# Patient Record
Sex: FEMALE | Race: WHITE | Employment: FULL TIME | URBAN - METROPOLITAN AREA
[De-identification: names, ages, dates, MRNs, and addresses within clinical notes are randomized per-mention and may not be internally consistent; named-entity substitution may affect disease eponyms.]

---

## 2017-01-03 ENCOUNTER — OFFICE VISIT (OUTPATIENT)
Dept: FAMILY MEDICINE | Facility: CLINIC | Age: 22
End: 2017-01-03
Payer: COMMERCIAL

## 2017-01-03 VITALS
OXYGEN SATURATION: 99 % | DIASTOLIC BLOOD PRESSURE: 68 MMHG | WEIGHT: 190 LBS | SYSTOLIC BLOOD PRESSURE: 122 MMHG | BODY MASS INDEX: 32.44 KG/M2 | TEMPERATURE: 99 F | HEIGHT: 64 IN

## 2017-01-03 DIAGNOSIS — Z71.84 COUNSELING ABOUT TRAVEL: Primary | ICD-10-CM

## 2017-01-03 DIAGNOSIS — Z23 VACCINE FOR SINGLE BACTERIAL DISEASE: ICD-10-CM

## 2017-01-03 PROCEDURE — 90691 TYPHOID VACCINE IM: CPT | Mod: GA | Performed by: FAMILY MEDICINE

## 2017-01-03 PROCEDURE — 90471 IMMUNIZATION ADMIN: CPT | Mod: GA | Performed by: FAMILY MEDICINE

## 2017-01-03 PROCEDURE — 99402 PREV MED CNSL INDIV APPRX 30: CPT | Mod: 25 | Performed by: FAMILY MEDICINE

## 2017-01-03 RX ORDER — ATOVAQUONE AND PROGUANIL HYDROCHLORIDE 250; 100 MG/1; MG/1
TABLET, FILM COATED ORAL
Qty: 25 TABLET | Refills: 0 | Status: SHIPPED | OUTPATIENT
Start: 2017-01-03 | End: 2017-01-18

## 2017-01-03 RX ORDER — CIPROFLOXACIN 500 MG/1
TABLET, FILM COATED ORAL
Qty: 18 TABLET | Refills: 0 | Status: SHIPPED | OUTPATIENT
Start: 2017-01-03 | End: 2017-01-18

## 2017-01-03 NOTE — MR AVS SNAPSHOT
"              After Visit Summary   1/3/2017    Chani Smith    MRN: [id number]           Patient Information     Date Of Birth          1995        Visit Information        Provider Department      1/3/2017 10:00 AM Radha Daniel, DO [facility]        Today's Diagnoses     Counseling about travel    -  1     Vaccine for single bacterial disease           Care Instructions    Today January 3, 2017 you received the    Typhoid - injectable. This vaccine is valid for two years.   .        **It is very important for the vaccinations to be given on the scheduled day(s), this helps ensure you receive the full effectiveness of the vaccine.**    Please call [facility] with any questions [phone]    Thank you for visiting [facility]          Follow-ups after your visit        Who to contact     If you have questions or need follow up information about today's clinic visit or your schedule please contact [facility] directly at [phone].  Normal or non-critical lab and imaging results will be communicated to you by [employer], letter or phone within 4 business days after the clinic has received the results. If you do not hear from us within 7 days, please contact the clinic through [employer] or phone. If you have a critical or abnormal lab result, we will notify you by phone as soon as possible.  Submit refill requests through [employer] or call your pharmacy and they will forward the refill request to us. Please allow 3 business days for your refill to be completed.          Additional Information About Your Visit        [employer] Information     [employer] lets you send messages to your doctor, view your test results, renew your prescriptions, schedule appointments and more. To sign up, go to [email or web address] . Click on \"Log in\" on the left side of the screen, which will take you to the Welcome page. Then click on \"Sign up Now\" on the right " "side of the page.     You will be asked to enter the access code listed below, as well as some personal information. Please follow the directions to create your username and password.     Your access code is: [email or web address]  Expires: 2017  4:20 PM     Your access code will  in 90 days. If you need help or a new code, please call your [place] clinic or [phone].        Care EveryWhere ID     This is your Care EveryWhere ID. This could be used by other organizations to access your [place] medical records  [id number]        Your Vitals Were     Temperature Height BMI (Body Mass Index) Pulse Oximetry          99  F (37.2  C) (Oral) 5' 4\" (1.626 m) 32.60 kg/m2 99%         Blood Pressure from Last 3 Encounters:   17 122/68   10/21/16 111/63   14 109/62    Weight from Last 3 Encounters:   17 190 lb (86.183 kg) (96.39 %*)   10/21/16 187 lb 12.8 oz (85.186 kg) (96.16 %*)   14 193 lb 6.4 oz (87.726 kg) (97.30 %*)     * Growth percentiles are based on [facility] 2-20 Years data.              We Performed the Following     TYPHOID VACCINE, IM          Today's Medication Changes          These changes are accurate as of: 1/3/17 10:21 AM.  If you have any questions, ask your nurse or doctor.               Start taking these medicines.        Dose/Directions    atovaquone-proguanil 250-100 MG per tablet   Commonly known as:  MALARONE   Used for:  Counseling about travel   Started by:  Radha Daniel, DO        Take one tablet daily, start 1 day prior to travel to malaria area and continue daily while there and for 7 days after leaving malaria area   Quantity:  25 tablet   Refills:  0       ciprofloxacin 500 MG tablet   Commonly known as:  CIPRO   Used for:  Counseling about travel   Started by:  Radha Daniel, DO        Take one tablet twice a day for up to 3 days as needed for traveler's diarrhea, may repeat as needed   Quantity:  18 tablet   Refills:  0            Where to get your medicines    "   These medications were [place] [facility] [place] [address]  [address] , [facility] [address]     Phone:  [phone]    - atovaquone-proguanil 250-100 MG per tablet  - ciprofloxacin 500 MG tablet             Primary Care Provider Office Phone # Fax #    VERONICA Mac [phone]       [facility] [address]  [facility] [address]        Thank you!     Thank you for choosing [facility]  for your care. Our goal is always to provide you with excellent care. Hearing back from our patients is one way we can continue to improve our services. Please take a few minutes to complete the written survey that you may receive in the mail after your visit with us. Thank you!             Your Updated Medication List - Protect others around you: Learn how to safely use, store and throw away your medicines at www.disposemymeds.org.          This list is accurate as of: 1/3/17 10:21 AM.  Always use your most recent med list.                   Brand Name Dispense Instructions for use    atovaquone-proguanil 250-100 MG per tablet    MALARONE    25 tablet    Take one tablet daily, start 1 day prior to travel to malaria area and continue daily while there and for 7 days after leaving malaria area       ciprofloxacin 500 MG tablet    CIPRO    18 tablet    Take one tablet twice a day for up to 3 days as needed for traveler's diarrhea, may repeat as needed

## 2017-01-03 NOTE — NURSING NOTE
"Chief Complaint   Patient presents with     Travel Clinic     [facility]     /68 mmHg  Temp(Src) 99  F (37.2  C) (Oral)  Ht 5' 4\" (1.626 m)  Wt 190 lb (86.183 kg)  BMI 32.60 kg/m2  SpO2 99% Estimated body mass index is 32.6 kg/(m^2) as calculated from the following:    Height as of this encounter: 5' 4\" (1.626 m).    Weight as of this encounter: 190 lb (86.183 kg).  bp completed using cuff size: regular      Health Maintenance that is potentially due pending provider review:  NONE    n/a  Madeline Gaming M.A.    "

## 2017-01-03 NOTE — PROGRESS NOTES
"  Itinerary:  [place]    Departure Date: 02/02/17    Return Date: 06/15/17    Length of Trip: 133 days    Purpose of Trip: school    Urban/Rural: both    Accommodations: host family/hotel    IMMUNIZATION HISTORY  Have you received any immunizations within the past 4 weeks?  No  Have you ever fainted from having your blood drawn or from an injection?  No  Have you ever had a fever reaction to vaccination?  No  Have you ever had any bad reaction or side effect from any vaccination?  No  Have you ever had hepatitis A or B vaccine?  Yes  Do you live (or work closely) with anyone who has AIDS, an AIDS-like condition, any other immune disorder or who is on chemotherapy for cancer or a   family history of immunodeficiency?  No  Have you received any injection of immune globulin or any blood products during the past 12 months?  No    Patient roomed by Madeline Gaming M.A.      Special medical concerns: none    /68 mmHg  Temp(Src) 99  F (37.2  C) (Oral)  Ht 5' 4\" (1.626 m)  Wt 190 lb (86.183 kg)  BMI 32.60 kg/m2  SpO2 99%  EXAM: deferred    Immunizations discussed include: Typhoid and pt declined flu  Discussed importance of rabies if any bites or scratches  Malaraia prophylaxis recommended: Malarone  Symptomatic treatment for traveler's diarrhea: ciprofloxacin    ASSESSMENT/PLAN:  1. Vaccine for single bacterial disease     - TYPHOID VACCINE, IM    2. Counseling about travel     - atovaquone-proguanil (MALARONE) 250-100 MG per tablet; Take one tablet daily, start 1 day prior to travel to malaria area and continue daily while there and for 7 days after leaving malaria area  Dispense: 25 tablet; Refill: 0  - ciprofloxacin (CIPRO) 500 MG tablet; Take one tablet twice a day for up to 3 days as needed for traveler's diarrhea, may repeat as needed  Dispense: 18 tablet; Refill: 0  I have reviewed general recommendations for safe travel   including: food/water precautions, insect avoidance,   roadway safety. Educational " materials and [employer] report provided.    Total visit time 30 minutes with over 50% of time spent counseling patient.

## 2017-01-03 NOTE — PATIENT INSTRUCTIONS
Today January 3, 2017 you received the    Typhoid - injectable. This vaccine is valid for two years.   .        **It is very important for the vaccinations to be given on the scheduled day(s), this helps ensure you receive the full effectiveness of the vaccine.**    Please call [facility] with any questions [phone]    Thank you for visiting [facility]

## 2017-01-18 ENCOUNTER — TELEPHONE (OUTPATIENT)
Dept: FAMILY MEDICINE | Facility: CLINIC | Age: 22
End: 2017-01-18

## 2017-01-18 ENCOUNTER — OFFICE VISIT (OUTPATIENT)
Dept: FAMILY MEDICINE | Facility: CLINIC | Age: 22
End: 2017-01-18
Payer: COMMERCIAL

## 2017-01-18 VITALS
WEIGHT: 190 LBS | BODY MASS INDEX: 32.44 KG/M2 | SYSTOLIC BLOOD PRESSURE: 122 MMHG | HEIGHT: 64 IN | TEMPERATURE: 98 F | HEART RATE: 62 BPM | DIASTOLIC BLOOD PRESSURE: 62 MMHG

## 2017-01-18 DIAGNOSIS — B00.1 RECURRENT COLD SORES: Primary | ICD-10-CM

## 2017-01-18 DIAGNOSIS — L30.4 INTERTRIGO: ICD-10-CM

## 2017-01-18 PROCEDURE — 99214 OFFICE O/P EST MOD 30 MIN: CPT | Performed by: NURSE PRACTITIONER

## 2017-01-18 RX ORDER — CLOTRIMAZOLE 1 %
CREAM (GRAM) TOPICAL 2 TIMES DAILY
Qty: 30 G | Refills: 1 | Status: SHIPPED | OUTPATIENT
Start: 2017-01-18 | End: 2019-02-12

## 2017-01-18 RX ORDER — ACYCLOVIR 50 MG/G
OINTMENT TOPICAL
Qty: 30 G | Refills: 2 | Status: SHIPPED | OUTPATIENT
Start: 2017-01-18 | End: 2019-02-12

## 2017-01-18 ASSESSMENT — PAIN SCALES - GENERAL: PAINLEVEL: NO PAIN (0)

## 2017-01-18 NOTE — PATIENT INSTRUCTIONS
Herpes: Caring for Sores  Good hygiene matters when you have herpes. Take care of your sores to speed healing. Neglected sores can lead to other infections.     Warm baths can ease itching and burning caused by sores.   To ease your symptoms    Take any medications as directed.    Take acetaminophen or ibuprofen for pain.    Take warm or cool baths to relieve itching of sores. And don t share towels when you have a sore.    Urinate in a tub of warm water to prevent burning. This works for women with genital herpes.    Don t wear tight clothes or nylon underwear. They can trap moisture, cause chafing, and prevent sores from healing.  To speed healing    Wash the sores with mild soap and water. And wash your hands after you touch a sore.    Dry the affected area completely.    Don t bandage sores. The dry air helps them heal.    Avoid ointments unless they are prescribed. They retain moisture and may cause other infections.    Don t pick at the sores. This can slow healing.    Don t touch your eyes when you have a sore. The virus may travel from your fingertips to your eyes.  Resources  American Social Health Association STD Hotline  747.493.2391  www.ashastd.org  Centers for Disease Control and Prevention  502.330.6444  www.cdc.gov/std     4508-3060 [employer], "[employer] [address] [place] [address], [place] [address]. All rights reserved. This information is not intended as a substitute for professional medical care. Always follow your healthcare professional's instructions.

## 2017-01-18 NOTE — MR AVS SNAPSHOT
After Visit Summary   1/18/2017    Chani Smith    MRN: [id number]           Patient Information     Date Of Birth          1995        Visit Information        Provider Department      1/18/2017 8:40 AM Kellie Echeverria APRN [facility]        Today's Diagnoses     Recurrent cold sores    -  1     Intertrigo           Care Instructions      Herpes: Caring for Sores  Good hygiene matters when you have herpes. Take care of your sores to speed healing. Neglected sores can lead to other infections.     Warm baths can ease itching and burning caused by sores.   To ease your symptoms    Take any medications as directed.    Take acetaminophen or ibuprofen for pain.    Take warm or cool baths to relieve itching of sores. And don t share towels when you have a sore.    Urinate in a tub of warm water to prevent burning. This works for women with genital herpes.    Don t wear tight clothes or nylon underwear. They can trap moisture, cause chafing, and prevent sores from healing.  To speed healing    Wash the sores with mild soap and water. And wash your hands after you touch a sore.    Dry the affected area completely.    Don t bandage sores. The dry air helps them heal.    Avoid ointments unless they are prescribed. They retain moisture and may cause other infections.    Don t pick at the sores. This can slow healing.    Don t touch your eyes when you have a sore. The virus may travel from your fingertips to your eyes.  Resources  American Social Health Association STD Hotline  188.642.8126  www.ashastd.org  Centers for Disease Control and Prevention  466.360.9773  www.cdc.gov/std     0292-8080 [employer]. [address] [place] [address]. All rights reserved. This information is not intended as a substitute for professional medical care. Always follow your healthcare professional's instructions.              Follow-ups after your visit        Who  "to contact     If you have questions or need follow up information about today's clinic visit or your schedule please contact [facility] directly at [phone].  Normal or non-critical lab and imaging results will be communicated to you by MyChart, letter or phone within 4 business days after the clinic has received the results. If you do not hear from us within 7 days, please contact the clinic through MyChart or phone. If you have a critical or abnormal lab result, we will notify you by phone as soon as possible.  Submit refill requests through [employer] or call your pharmacy and they will forward the refill request to us. Please allow 3 business days for your refill to be completed.          Additional Information About Your Visit        [employer] Information     [employer] lets you send messages to your doctor, view your test results, renew your prescriptions, schedule appointments and more. To sign up, go to [email or web address] . Click on \"Log in\" on the left side of the screen, which will take you to the Welcome page. Then click on \"Sign up Now\" on the right side of the page.     You will be asked to enter the access code listed below, as well as some personal information. Please follow the directions to create your username and password.     Your access code is: [email or web address]  Expires: 2017  4:20 PM     Your access code will  in 90 days. If you need help or a new code, please call your [place] clinic or [phone].        Care EveryWhere ID     This is your Care EveryWhere ID. This could be used by other organizations to access your [place] medical records  [id number]        Your Vitals Were     Pulse Temperature Height BMI (Body Mass Index) Last Period       62 98  F (36.7  C) (Tympanic) 5' 4\" (1.626 m) 32.60 kg/m2 2017        Blood Pressure from Last 3 Encounters:   17 122/62   17 122/68   10/21/16 111/63    Weight from Last 3 Encounters:   17 190 lb " (86.183 kg) (96.38 %*)   01/03/17 190 lb (86.183 kg) (96.39 %*)   10/21/16 187 lb 12.8 oz (85.186 kg) (96.16 %*)     * Growth percentiles are based on [facility] 2-20 Years data.              Today, you had the following     No orders found for display         Today's Medication Changes          These changes are accurate as of: 1/18/17  9:07 AM.  If you have any questions, ask your nurse or doctor.               Start taking these medicines.        Dose/Directions    acyclovir 5 % ointment   Commonly known as:  ZOVIRAX   Used for:  Recurrent cold sores   Started by:  Kellie Echeverria APRN CNP        Apply topically 6 times daily   Quantity:  30 g   Refills:  2       clotrimazole 1 % cream   Commonly known as:  LOTRIMIN   Used for:  Intertrigo   Started by:  Kellie Echeverria APRN CNP        Apply topically 2 times daily   Quantity:  30 g   Refills:  1         Stop taking these medicines if you haven't already. Please contact your care team if you have questions.     atovaquone-proguanil 250-100 MG per tablet   Commonly known as:  MALARONE   Stopped by:  Klelie Echeverria APRN CNP           ciprofloxacin 500 MG tablet   Commonly known as:  CIPRO   Stopped by:  Kellie Echeverria APRN CNP                Where to get your medicines      These medications were sent to [facility] [address] [address]     Phone:  [phone]    - acyclovir 5 % ointment  - clotrimazole 1 % cream             Primary Care Provider Office Phone # Fax #    VERONICA Mac [phone]       [facility] [address] [facility] [address]        Thank you!     Thank you for choosing [facility]  for your care. Our goal is always to provide you with excellent care. Hearing back from our patients is one way we can continue to improve our services. Please take a few minutes to complete the written survey that  you may receive in the mail after your visit with us. Thank you!             Your Updated Medication List - Protect others around you: Learn how to safely use, store and throw away your medicines at www.disposemymeds.org.          This list is accurate as of: 1/18/17  9:07 AM.  Always use your most recent med list.                   Brand Name Dispense Instructions for use    acyclovir 5 % ointment    ZOVIRAX    30 g    Apply topically 6 times daily       clotrimazole 1 % cream    LOTRIMIN    30 g    Apply topically 2 times daily

## 2017-01-18 NOTE — NURSING NOTE
"Initial /62 mmHg  Pulse 62  Temp(Src) 98  F (36.7  C) (Tympanic)  Ht 5' 4\" (1.626 m)  Wt 190 lb (86.183 kg)  BMI 32.60 kg/m2  LMP 01/16/2017 Estimated body mass index is 32.6 kg/(m^2) as calculated from the following:    Height as of this encounter: 5' 4\" (1.626 m).    Weight as of this encounter: 190 lb (86.183 kg). .      "

## 2017-01-18 NOTE — TELEPHONE ENCOUNTER
This is non formulary. No alternatives listed on their web site.   Please ask her to contact her insurance company to find out what they will cover for meds for recurrent cold sores.   Otherwise she may want to purchase abreva over the counter.   [email or web address]  Thanks Kellie GOULD

## 2017-01-18 NOTE — TELEPHONE ENCOUNTER
PA required on: Acyclovir Ointment  Patient Insurance is: Blue Plus  Insurance phone number is:   ID #: [id number]  BIN#: [id number]    Please let us know when PA is granted or denied. Thank you.    Tawny Stanford, Float Technician  [place]: [place]

## 2017-01-18 NOTE — PROGRESS NOTES
"  SUBJECTIVE:                                                    Chani Smith is a 21 year old female who presents to clinic today for the following health issues:      Leaving for out of the country soon- [facility] - has been to travel clinic. Notes reviewed.   Would like some rx refills just in case she needs them while out of the country  One is for sold cores she has been using acyclovir and this works well for her.   And underarm rash that comes and goes. Better with clotrimazole worse in the summer when hot .          -------------------------------------    Problem list and histories reviewed & adjusted, as indicated.  Additional history: as documented    Labs reviewed in EPIC  Problem list, Medication list, Allergies, and Medical/Social/Surgical histories reviewed in [facility] and updated as appropriate.    ROS:  C: NEGATIVE for fever, chills, change in weight  INTEGUMENTARY/SKIN: as above   E/M: NEGATIVE for ear, mouth and throat problems  R: NEGATIVE for significant cough or SOB  CV: NEGATIVE for chest pain, palpitations or peripheral edema    OBJECTIVE:                                                    /62 mmHg  Pulse 62  Temp(Src) 98  F (36.7  C) (Tympanic)  Ht 5' 4\" (1.626 m)  Wt 190 lb (86.183 kg)  BMI 32.60 kg/m2  LMP 01/16/2017  Body mass index is 32.6 kg/(m^2).   GENERAL: healthy, alert, well nourished, well hydrated, no distress  HENT: ear canals- normal; TMs- normal; Nose- normal; Mouth- no ulcers, no lesions  NECK: no tenderness, no adenopathy, no asymmetry, no masses, no stiffness; thyroid- normal to palpation  RESP: lungs clear to auscultation - no rales, no rhonchi, no wheezes  CV: regular rates and rhythm, normal S1 S2, no S3 or S4 and no murmur, no click or rub -  ABDOMEN: soft, no tenderness, no  hepatosplenomegaly, no masses, normal bowel sounds    Diagnostic test results:  none      ASSESSMENT/PLAN:                                                    1. Recurrent cold " sores  Refill today   - acyclovir (ZOVIRAX) 5 % ointment; Apply topically 6 times daily  Dispense: 30 g; Refill: 2    2. Intertrigo  Symptomatic care strategies reviewed.   - clotrimazole (LOTRIMIN) 1 % cream; Apply topically 2 times daily  Dispense: 30 g; Refill: 1      Follow up with Provider - Call or return to the clinic with any worsening of symptoms or no resolution. Patient/Parent verbalized understanding and is in agreement. Medication side effects reviewed.   Current Outpatient Prescriptions   Medication Sig Dispense Refill     acyclovir (ZOVIRAX) 5 % ointment Apply topically 6 times daily 30 g 2     clotrimazole (LOTRIMIN) 1 % cream Apply topically 2 times daily 30 g 1        See Patient Instructions    VERONICA Mac [facility]

## 2017-07-07 ENCOUNTER — OFFICE VISIT (OUTPATIENT)
Dept: FAMILY MEDICINE | Facility: CLINIC | Age: 22
End: 2017-07-07
Payer: COMMERCIAL

## 2017-07-07 VITALS
WEIGHT: 184 LBS | BODY MASS INDEX: 31.58 KG/M2 | DIASTOLIC BLOOD PRESSURE: 72 MMHG | TEMPERATURE: 98 F | HEART RATE: 67 BPM | SYSTOLIC BLOOD PRESSURE: 114 MMHG

## 2017-07-07 DIAGNOSIS — Z30.09 COUNSELING FOR BIRTH CONTROL, ORAL CONTRACEPTIVES: Primary | ICD-10-CM

## 2017-07-07 DIAGNOSIS — E34.9 HORMONE IMBALANCE: ICD-10-CM

## 2017-07-07 LAB
ESTRADIOL SERPL-MCNC: 47 PG/ML
INSULIN SERPL-ACNC: 22.4 MU/L (ref 3–25)
PROGEST SERPL-MCNC: 1.3 NG/ML
TSH SERPL DL<=0.005 MIU/L-ACNC: 3.58 MU/L (ref 0.4–4)

## 2017-07-07 PROCEDURE — 84403 ASSAY OF TOTAL TESTOSTERONE: CPT | Performed by: NURSE PRACTITIONER

## 2017-07-07 PROCEDURE — 84443 ASSAY THYROID STIM HORMONE: CPT | Performed by: NURSE PRACTITIONER

## 2017-07-07 PROCEDURE — 82670 ASSAY OF TOTAL ESTRADIOL: CPT | Performed by: NURSE PRACTITIONER

## 2017-07-07 PROCEDURE — 36415 COLL VENOUS BLD VENIPUNCTURE: CPT | Performed by: NURSE PRACTITIONER

## 2017-07-07 PROCEDURE — 83525 ASSAY OF INSULIN: CPT | Performed by: NURSE PRACTITIONER

## 2017-07-07 PROCEDURE — 99214 OFFICE O/P EST MOD 30 MIN: CPT | Performed by: NURSE PRACTITIONER

## 2017-07-07 PROCEDURE — 84144 ASSAY OF PROGESTERONE: CPT | Performed by: NURSE PRACTITIONER

## 2017-07-07 RX ORDER — NORGESTIMATE AND ETHINYL ESTRADIOL 0.25-0.035
1 KIT ORAL DAILY
Qty: 90 TABLET | Refills: 3 | Status: SHIPPED | OUTPATIENT
Start: 2017-07-07 | End: 2018-05-10

## 2017-07-07 NOTE — PATIENT INSTRUCTIONS
For Teens: Birth Control Options  If you have sex, you can get pregnant. Birth control helps lessen the chance that you'll get pregnant during sex. Having sex is a serious decision that you should think about carefully. If you decide to have sex, your healthcare provider can help you decide which type of birth control is best for you. Some of the most common types are described below. No matter which type you choose, remember to use it every time.  Condoms  A condom is a thin covering that fits over the penis. A condom catches sperm that come out of the penis during sex. A condom also helps prevent the spread of certain sexually transmitted diseases (STDs).  Spermicide  Spermicide is a gel, foam, cream, or tablet that you put into your vagina. These kill sperm that touch them. They are most effective when used with a condom, diaphragm, or cervical cap.   The pill  The birth control pill is taken daily to stop your body from releasing an egg each month. It has to be taken at the same time each day.  Time-release hormones  Hormones like the ones used in birth control pills can be given in other ways. These include a skin patch, a ring inserted in your vagina, injections given in your arm or buttock every 3 months, or an implant placed in your arm that will remain for up to 3 years. You may find one of these methods easier to stick to than pills.  Diaphragm or cervical cap  Diaphragms and cervical caps are round rubber cups that keep sperm out of the uterus and hold spermicide in place. You put them into your vagina before you have sex.  Intrauterine device (IUD)  This is a device your health care provider will insert into the uterus and it can be left in place for 3, 5, or 10 years depending on the type you choose. This is only a good choice for those who are in stable monogamous relationships where both partners do not have sexually transmitted infections. This is because certain sexually transmitted infections can  cause a more serious infection with an IUD in place.     Remember  Here are important things to consider:    Except for condoms, birth control methods don't protect you from sexually transmitted diseases. And condoms don't give you 100% protection.    You can decide not to have sex. This is called abstinence. Abstinence is the only way to be absolutely sure you won't get pregnant.    Be sure you are ready before you make the decision to have sex. Don't have sex because you think everyone else is doing it.    Whatever birth control you use, learn how to use it the right way.   Date Last Reviewed: 5/9/2015 2000-2017 [employer]. [address] [place] [address], [place] [address]. All rights reserved. This information is not intended as a substitute for professional medical care. Always follow your healthcare professional's instructions.        Controlling Adult or Adolescent Acne     Look for water-based, oil-free skin care products. These are less likely to clog your pores.     You stand the best chance of controlling your acne if you follow your treatment plan. Be patient. Acne often takes months, not days or weeks, to improve. Ask your healthcare provider when you can expect your skin to look better. If you don t see results by your goal date, call your healthcare provider. He or she may want to give you some other type of treatment.  Using skin care products and cosmetics  Besides following your treatment plan, take care in choosing skin care products and cosmetics. The following tips may help:    Choose gentle, oil-free soaps and facial cleansers.    Avoid harsh acne scrubs, cleansers, or astringents. These types of products can irritate your skin.    Ask your healthcare provider before buying over-the-counter acne treatments. Some of these, such as those with benzoyl peroxide or salicylic acid, can work. But they often have side effects, such as skin getting too dry with treatments that are too  strong.    Read labels on makeup and moisturizers. Choose those that are water based and oil free. Look for the term noncomedogenic. It means that the product won t clog your pores.  Caring for your skin  The right skin care routine can help keep your skin healthy. To take good care of your skin, try these tips:    Gently wash your face or other affected skin twice a day with a mild cleanser. Using your fingertips, smooth the cleanser over your skin. Rinse your skin well. Pat it dry.    If your healthcare provider has approved any over-the-counter acne medicine, use as directed. Use it after you wash your skin. Apply the medicine to all areas where you get acne. Don t just treat acne you can see now. New blemishes may be in progress but not in view yet. Treat all the skin.    Don t squeeze or pick blemishes. Acne blemishes may heal on their own. But squeezing can cause scarring. Scars will remain after acne blemishes heal.    Sponges, brushes, or other abrasive tools can irritate the skin. Avoid using them.    If you use soft sponges or cloths to apply your makeup, keep them clean.  Getting good results  Learning more about acne is the first step toward controlling this condition. Know that acne under treatment frequently gets worse initially before it improves. But with proper treatment and skin care, you can manage your acne and feel better about your skin.   Date Last Reviewed: 5/1/2015 2000-2017 [employer]. [address] [place] [address], [place] [address]. All rights reserved. This information is not intended as a substitute for professional medical care. Always follow your healthcare professional's instructions.

## 2017-07-07 NOTE — PROGRESS NOTES
SUBJECTIVE:                                                    Chain Smith is a 21 year old female who presents to clinic today for the following health issues:      Contraception    Has been on birth control before- the pill  Would like the pill- possibly a lower hormone dose  Sexually active  Patient's last menstrual period was 06/07/2017.  Periods are not as bad  Least weight gain desired this time  Natural cosmetic use now and making the acne worse.   Got home from a 4 month trip to [facility].     abreva recently for cold sores         Labs  Would like labs done to check for hormone imbalance  Family history of diabetes.            Problem list and histories reviewed & adjusted, as indicated.  Additional history: as documented    Patient Active Problem List   Diagnosis     Acne     Ankle pain     Mood disorder (H)     Dysmenorrhea     Past Surgical History:   Procedure Laterality Date     BUNIONECTOMY MACKENZIE  4/22/2011    Procedure:BUNIONECTOMY MACKENZIE; Subtalar Implant; Surgeon:HELGA URENA; Location:[facility]     LENGTHEN TENDON ACHILLES  4/22/2011    Procedure:LENGTHEN TENDON ACHILLES; Surgeon:HELGA URENA; Location:[facility] OR       Social History   Substance Use Topics     Smoking status: Never Smoker     Smokeless tobacco: Never Used     Alcohol use No     Family History   Problem Relation Age of Onset     Other - See Comments Paternal Grandfather                  Reviewed and updated as needed this visit by clinical staff  Tobacco  Allergies       Reviewed and updated as needed this visit by Provider         ROS:   ROS: 10 point ROS neg other than the symptoms noted above in the HPI.      OBJECTIVE:                                                    /72  Pulse 67  Temp 98  F (36.7  C) (Tympanic)  Wt 184 lb (83.5 kg)  LMP 06/07/2017  BMI 31.58 kg/m2  Body mass index is 31.58 kg/(m^2).   GENERAL: healthy, alert, well nourished, well hydrated, no  distress  HENT: ear canals- normal; TMs- normal; Nose- normal; Mouth- no ulcers, no lesions  NECK: no tenderness, no adenopathy, no asymmetry, no masses, no stiffness; thyroid- normal to palpation  RESP: lungs clear to auscultation - no rales, no rhonchi, no wheezes  CV: regular rates and rhythm, normal S1 S2, no S3 or S4 and no murmur, no click or rub -  ABDOMEN: soft, no tenderness, no  hepatosplenomegaly, no masses, normal bowel sounds    Diagnostic test results:  Labs pending      ASSESSMENT/PLAN:                                                    1. Counseling for birth control, oral contraceptives  - norgestimate-ethinyl estradiol (ORTHO-CYCLEN, SPRINTEC) 0.25-35 MG-MCG per tablet; Take 1 tablet by mouth daily  Dispense: 90 tablet; Refill: 3    2. Hormone imbalance  - Estradiol  - Testosterone, total  - Progesterone  - Insulin level  - TSH with free T4 reflex      Follow up with Provider - Call or return to the clinic with any worsening of symptoms or no resolution. Patient/Parent verbalized understanding and is in agreement. Medication side effects reviewed.      See Patient Instructions    VERONICA Mac [facility]

## 2017-07-07 NOTE — NURSING NOTE
"Chief Complaint   Patient presents with     Contraception     talk about going on bc       Initial /72  Pulse 67  Temp 98  F (36.7  C) (Tympanic)  Wt 184 lb (83.5 kg)  LMP 06/07/2017  BMI 31.58 kg/m2 Estimated body mass index is 31.58 kg/(m^2) as calculated from the following:    Height as of 1/18/17: 5' 4\" (1.626 m).    Weight as of this encounter: 184 lb (83.5 kg).  Medication Reconciliation: complete    Health Maintenance that is potentially due pending provider review:  NONE    n/a      "

## 2017-07-07 NOTE — MR AVS SNAPSHOT
After Visit Summary   7/7/2017    Chani Smith    MRN: [id number]           Patient Information     Date Of Birth          1995        Visit Information        Provider Department      7/7/2017 8:20 AM Kellie Echeverria APRN [facility]        Today's Diagnoses     Counseling for birth control, oral contraceptives    -  1    Hormone imbalance          Care Instructions      For Teens: Birth Control Options  If you have sex, you can get pregnant. Birth control helps lessen the chance that you'll get pregnant during sex. Having sex is a serious decision that you should think about carefully. If you decide to have sex, your healthcare provider can help you decide which type of birth control is best for you. Some of the most common types are described below. No matter which type you choose, remember to use it every time.  Condoms  A condom is a thin covering that fits over the penis. A condom catches sperm that come out of the penis during sex. A condom also helps prevent the spread of certain sexually transmitted diseases (STDs).  Spermicide  Spermicide is a gel, foam, cream, or tablet that you put into your vagina. These kill sperm that touch them. They are most effective when used with a condom, diaphragm, or cervical cap.   The pill  The birth control pill is taken daily to stop your body from releasing an egg each month. It has to be taken at the same time each day.  Time-release hormones  Hormones like the ones used in birth control pills can be given in other ways. These include a skin patch, a ring inserted in your vagina, injections given in your arm or buttock every 3 months, or an implant placed in your arm that will remain for up to 3 years. You may find one of these methods easier to stick to than pills.  Diaphragm or cervical cap  Diaphragms and cervical caps are round rubber cups that keep sperm out of the uterus and hold spermicide in place. You put  them into your vagina before you have sex.  Intrauterine device (IUD)  This is a device your health care provider will insert into the uterus and it can be left in place for 3, 5, or 10 years depending on the type you choose. This is only a good choice for those who are in stable monogamous relationships where both partners do not have sexually transmitted infections. This is because certain sexually transmitted infections can cause a more serious infection with an IUD in place.     Remember  Here are important things to consider:    Except for condoms, birth control methods don't protect you from sexually transmitted diseases. And condoms don't give you 100% protection.    You can decide not to have sex. This is called abstinence. Abstinence is the only way to be absolutely sure you won't get pregnant.    Be sure you are ready before you make the decision to have sex. Don't have sex because you think everyone else is doing it.    Whatever birth control you use, learn how to use it the right way.   Date Last Reviewed: 5/9/2015 2000-2017 [employer]. [address] [place] [address]. All rights reserved. This information is not intended as a substitute for professional medical care. Always follow your healthcare professional's instructions.        Controlling Adult or Adolescent Acne     Look for water-based, oil-free skin care products. These are less likely to clog your pores.     You stand the best chance of controlling your acne if you follow your treatment plan. Be patient. Acne often takes months, not days or weeks, to improve. Ask your healthcare provider when you can expect your skin to look better. If you don t see results by your goal date, call your healthcare provider. He or she may want to give you some other type of treatment.  Using skin care products and cosmetics  Besides following your treatment plan, take care in choosing skin care products and cosmetics. The following  tips may help:    Choose gentle, oil-free soaps and facial cleansers.    Avoid harsh acne scrubs, cleansers, or astringents. These types of products can irritate your skin.    Ask your healthcare provider before buying over-the-counter acne treatments. Some of these, such as those with benzoyl peroxide or salicylic acid, can work. But they often have side effects, such as skin getting too dry with treatments that are too strong.    Read labels on makeup and moisturizers. Choose those that are water based and oil free. Look for the term noncomedogenic. It means that the product won t clog your pores.  Caring for your skin  The right skin care routine can help keep your skin healthy. To take good care of your skin, try these tips:    Gently wash your face or other affected skin twice a day with a mild cleanser. Using your fingertips, smooth the cleanser over your skin. Rinse your skin well. Pat it dry.    If your healthcare provider has approved any over-the-counter acne medicine, use as directed. Use it after you wash your skin. Apply the medicine to all areas where you get acne. Don t just treat acne you can see now. New blemishes may be in progress but not in view yet. Treat all the skin.    Don t squeeze or pick blemishes. Acne blemishes may heal on their own. But squeezing can cause scarring. Scars will remain after acne blemishes heal.    Sponges, brushes, or other abrasive tools can irritate the skin. Avoid using them.    If you use soft sponges or cloths to apply your makeup, keep them clean.  Getting good results  Learning more about acne is the first step toward controlling this condition. Know that acne under treatment frequently gets worse initially before it improves. But with proper treatment and skin care, you can manage your acne and feel better about your skin.   Date Last Reviewed: 5/1/2015 2000-2017 [employer]. [address] [place] [address], [place] [address]. All rights reserved. This  "information is not intended as a substitute for professional medical care. Always follow your healthcare professional's instructions.                Follow-ups after your visit        Who to contact     If you have questions or need follow up information about today's clinic visit or your schedule please contact [facility] directly at [phone].  Normal or non-critical lab and imaging results will be communicated to you by MyChart, letter or phone within 4 business days after the clinic has received the results. If you do not hear from us within 7 days, please contact the clinic through MyChart or phone. If you have a critical or abnormal lab result, we will notify you by phone as soon as possible.  Submit refill requests through [employer] or call your pharmacy and they will forward the refill request to us. Please allow 3 business days for your refill to be completed.          Additional Information About Your Visit        MyChart Information     [employer] lets you send messages to your doctor, view your test results, renew your prescriptions, schedule appointments and more. To sign up, go to [email or web address] . Click on \"Log in\" on the left side of the screen, which will take you to the Welcome page. Then click on \"Sign up Now\" on the right side of the page.     You will be asked to enter the access code listed below, as well as some personal information. Please follow the directions to create your username and password.     Your access code is: [email or web address]  Expires: 10/5/2017  9:03 AM     Your access code will  in 90 days. If you need help or a new code, please call your [facility] or [phone].        Care EveryWhere ID     This is your Care EveryWhere ID. This could be used by other organizations to access your [facility] medical records  [id number]        Your Vitals Were     Pulse Temperature Last Period BMI (Body Mass Index)          67 98  F (36.7  C) (Tympanic) " 06/07/2017 31.58 kg/m2         Blood Pressure from Last 3 Encounters:   07/07/17 114/72   01/18/17 122/62   01/03/17 122/68    Weight from Last 3 Encounters:   07/07/17 184 lb (83.5 kg) (95 %)*   01/18/17 190 lb (86.2 kg) (96 %)*   01/03/17 190 lb (86.2 kg) (96 %)*     * Growth percentiles are based on [facility] 2-20 Years data.              We Performed the Following     Estradiol     Insulin level     Progesterone     Testosterone, total     TSH with free T4 reflex          Today's Medication Changes          These changes are accurate as of: 7/7/17  9:03 AM.  If you have any questions, ask your nurse or doctor.               Start taking these medicines.        Dose/Directions    norgestimate-ethinyl estradiol 0.25-35 MG-MCG per tablet   Commonly known as:  ORTHO-CYCLEN, SPRINTEC   Used for:  Counseling for birth control, oral contraceptives   Started by:  Kellie Echeverria APRN CNP        Dose:  1 tablet   Take 1 tablet by mouth daily   Quantity:  90 tablet   Refills:  3            Where to get your medicines      These medications were sent to [facility] [place] [id number]     Phone:  [phone]     norgestimate-ethinyl estradiol 0.25-35 MG-MCG per tablet                Primary Care Provider Office Phone # Fax #    VERONICA Mac [phone]       [facility] [address] [place] [id number]        Equal Access to Services     SALVATORE ESPINOZA AH: Hadii maura ku hadasho Soomaali, waaxda luqadaha, qaybta kaalmada adeegyada, jorge sanders. So [facility] [phone].    ATENCIÓN: Si habla vinay, tiene a kerns disposición servicios gratuitos de asistencia lingüística. Llame al [phone].    We comply with applicable federal civil rights laws and [place] laws. We do not discriminate on the basis of race, color, national origin, age, disability sex, sexual orientation or gender  identity.            Thank you!     Thank you for choosing [facility]  for your care. Our goal is always to provide you with excellent care. Hearing back from our patients is one way we can continue to improve our services. Please take a few minutes to complete the written survey that you may receive in the mail after your visit with us. Thank you!             Your Updated Medication List - Protect others around you: Learn how to safely use, store and throw away your medicines at www.disposemymeds.org.          This list is accurate as of: 7/7/17  9:03 AM.  Always use your most recent med list.                   Brand Name Dispense Instructions for use Diagnosis    acyclovir 5 % ointment    ZOVIRAX    30 g    Apply topically 6 times daily    Recurrent cold sores       clotrimazole 1 % cream    LOTRIMIN    30 g    Apply topically 2 times daily    Intertrigo       norgestimate-ethinyl estradiol 0.25-35 MG-MCG per tablet    ORTHO-CYCLEN, SPRINTEC    90 tablet    Take 1 tablet by mouth daily    Counseling for birth control, oral contraceptives

## 2017-07-07 NOTE — LETTER
[facility]  [address] [facility] [address]  Phone: [phone]    July 11, 2017    Chani Smith  [address]   [facility] [address]              Dear Ms. Smith,    Normal and some level   Normal progesterone level   Normal thyroid level   Normal estrogen level   Testosterone level is pending yet   Please call with any questions or concerns   Take care,     Results for orders placed or performed in visit on 07/07/17   Estradiol   Result Value Ref Range    Estradiol 47 pg/mL   Progesterone   Result Value Ref Range    Progesterone 1.3 ng/mL   Insulin level   Result Value Ref Range    Insulin 22.4 3 - 25 mU/L   TSH with free T4 reflex   Result Value Ref Range    TSH 3.58 0.40 - 4.00 mU/L       Sincerely,      Kellie Echeverria FNP-BC / sadi

## 2017-07-12 LAB — TESTOST SERPL-MCNC: 22 NG/DL (ref 8–60)

## 2018-05-02 ENCOUNTER — TRANSFERRED RECORDS (OUTPATIENT)
Dept: HEALTH INFORMATION MANAGEMENT | Facility: CLINIC | Age: 23
End: 2018-05-02

## 2018-05-03 ENCOUNTER — TELEPHONE (OUTPATIENT)
Dept: FAMILY MEDICINE | Facility: CLINIC | Age: 23
End: 2018-05-03

## 2018-05-03 NOTE — TELEPHONE ENCOUNTER
Mom Jessy called-States pt is [facility]. States pt was depressed and suicidal. Currently she is in the ED on a hold as they don't have any beds available. Pt is getting more anxious and depressed. Wants to leave. Mom is hoping Kellie can call her cell so he can talk to pt/mom. [phone] Please advise. Jo Stern RN

## 2018-05-03 NOTE — TELEPHONE ENCOUNTER
Depression/ Suicidal    Pt is currently sitting in a room [facility] as they do not have a room for her  Mother is wondering if Kellie MARTIN-KHLOE can help her.  [facility]sa [facility]

## 2018-05-04 NOTE — TELEPHONE ENCOUNTER
Phone consult with Mom and Chani last night.  She is being admitted to [facility] on a 72 hour hold and will follow up with us as needed.  Support given  Thanks Kellie HIDALGO-BC

## 2018-05-10 ENCOUNTER — OFFICE VISIT (OUTPATIENT)
Dept: FAMILY MEDICINE | Facility: CLINIC | Age: 23
End: 2018-05-10
Payer: MEDICAID

## 2018-05-10 VITALS
DIASTOLIC BLOOD PRESSURE: 62 MMHG | TEMPERATURE: 97.3 F | HEART RATE: 80 BPM | SYSTOLIC BLOOD PRESSURE: 118 MMHG | RESPIRATION RATE: 14 BRPM | BODY MASS INDEX: 31.17 KG/M2 | OXYGEN SATURATION: 99 % | WEIGHT: 181.6 LBS

## 2018-05-10 DIAGNOSIS — Z30.09 COUNSELING FOR BIRTH CONTROL, ORAL CONTRACEPTIVES: ICD-10-CM

## 2018-05-10 DIAGNOSIS — Z11.3 SCREEN FOR STD (SEXUALLY TRANSMITTED DISEASE): ICD-10-CM

## 2018-05-10 DIAGNOSIS — F39 MOOD DISORDER (H): Primary | ICD-10-CM

## 2018-05-10 LAB
SPECIMEN SOURCE: NORMAL
WET PREP SPEC: NORMAL

## 2018-05-10 PROCEDURE — 86704 HEP B CORE ANTIBODY TOTAL: CPT | Performed by: NURSE PRACTITIONER

## 2018-05-10 PROCEDURE — 86695 HERPES SIMPLEX TYPE 1 TEST: CPT | Performed by: NURSE PRACTITIONER

## 2018-05-10 PROCEDURE — 36415 COLL VENOUS BLD VENIPUNCTURE: CPT | Performed by: NURSE PRACTITIONER

## 2018-05-10 PROCEDURE — 87389 HIV-1 AG W/HIV-1&-2 AB AG IA: CPT | Performed by: NURSE PRACTITIONER

## 2018-05-10 PROCEDURE — 87491 CHLMYD TRACH DNA AMP PROBE: CPT | Performed by: NURSE PRACTITIONER

## 2018-05-10 PROCEDURE — 86706 HEP B SURFACE ANTIBODY: CPT | Performed by: NURSE PRACTITIONER

## 2018-05-10 PROCEDURE — 87591 N.GONORRHOEAE DNA AMP PROB: CPT | Performed by: NURSE PRACTITIONER

## 2018-05-10 PROCEDURE — 86780 TREPONEMA PALLIDUM: CPT | Performed by: NURSE PRACTITIONER

## 2018-05-10 PROCEDURE — 87210 SMEAR WET MOUNT SALINE/INK: CPT | Performed by: NURSE PRACTITIONER

## 2018-05-10 PROCEDURE — 99214 OFFICE O/P EST MOD 30 MIN: CPT | Performed by: NURSE PRACTITIONER

## 2018-05-10 PROCEDURE — 86696 HERPES SIMPLEX TYPE 2 TEST: CPT | Performed by: NURSE PRACTITIONER

## 2018-05-10 PROCEDURE — 86803 HEPATITIS C AB TEST: CPT | Performed by: NURSE PRACTITIONER

## 2018-05-10 RX ORDER — OLANZAPINE 2.5 MG/1
2.5 TABLET, FILM COATED ORAL AT BEDTIME
Qty: 30 TABLET | Refills: 2 | Status: SHIPPED | OUTPATIENT
Start: 2018-05-10 | End: 2019-02-12

## 2018-05-10 RX ORDER — NORGESTIMATE AND ETHINYL ESTRADIOL 0.25-0.035
1 KIT ORAL DAILY
Qty: 90 TABLET | Refills: 3 | Status: SHIPPED | OUTPATIENT
Start: 2018-05-10 | End: 2019-02-12

## 2018-05-10 RX ORDER — HYDROXYZINE HYDROCHLORIDE 25 MG/1
25-50 TABLET, FILM COATED ORAL EVERY 6 HOURS PRN
Qty: 31 TABLET | Refills: 2 | Status: SHIPPED | OUTPATIENT
Start: 2018-05-10 | End: 2019-05-13

## 2018-05-10 ASSESSMENT — PAIN SCALES - GENERAL: PAINLEVEL: NO PAIN (0)

## 2018-05-10 NOTE — PROGRESS NOTES
"  SUBJECTIVE:   Chani Martins is a 22 year old female who presents to clinic today for the following health issues:          Hospital Follow-up Visit:    Hospital/Nursing Home/ Rehab Facility: [facility]   Date of Admission: 05/02/2018  Date of Discharge: 05/07/2018  Reason(s) for Admission: Mental health    BRIEF HOSPITAL COURSE: This 22 y.o. female admitted 5/2/2018 To  [facility] for the assessment and treatment of the above presentation. This was a voluntary admission.     5/7/2018 Day of Discharge: Patient continues to decline medications for depression and anxiety. Her family contacted the treatment team and requested that she be discharged. Both patient and family believe that her mental health issues would be better served on an outpatient basis. Patient had been making consistent efforts to attend groups. Insight appeared to be improving slightly but she remained rigid in her stance about not wanting psychiatric medications which I believe are strongly indicated in her case. She denied suicidal or history of homicidal ideation. She was discharged Against Medical Advice.     5/5: Pt is requesting discharge today, states she feels \"fine\" all of the suicidal ideations and plans are no longer present. Pt states she feels uncomfortable here, wants to go home to her family support system; reminded pt that her support system was there when she was planning to stag an accident to spare her mother pain. Pt reports she has a therapy appointment on Thursday at the  [facility] in [place]. Pt states she has no interest in medications, prefers \"natural\" methods to deal with her depression and anxiety. Pt reports she was expecting to be able to speak with a psychologist or therapist while hospitalized, encouraged pt to speak with  today and to participate in groups, pt stated she wasn't comfortable talking in group. Discussed a 12 hour intent to leave " "and a 72 hour hold that would not go into effect until Monday. Encouraged pt to remain voluntary until Monday to ensure we have a safe plan for discharge. Pt was quite upset with writer and refused to speak any further.     Phoned and spoke with pt's mother, Jessy. Mom wants to ensure that her daughter is being respected for her decision to come into the hospital and get help, I assured mom that her daughter is respected. Mom stated that pt felt as though she was being \"bullied\" about staying through the weekend; discussed with mom why pt came to the hospital, that her safety was of great concern to us and we want to ensure pt has a good discharge plan in place when she is discharged. Mom reports that pt has a solid support system, \"an army of support\" once discharged. Mom stated that pt's brother can pick her up tomorrow around 1 PM if she is discharged tomorrow. I let mom know that it will be up to the psychiatrist tomorrow if pt is to be discharged.      05/04/18 Day of Admission:    This is a 22-year-old female with a past history of depression and anxiety with been struggling with worsening depression characterized by increased hopelessness, helplessness, sadness, reduced appetite, low mood and low energy, and suicidal ideation with plans to crash her car.  Patient also reports that she has been experiencing increased anxiety characterized by excessive worrying along with racing thoughts.  She has been struggling with disturbed sleep due to frequent nighttime awakenings.  She is only currently sleeping 5 hours per night and states that she has difficulty initiating sleep.  She is also been struggling with intermittent nightmares but denies symptoms of PTSD including flashbacks or intrusive memories.  She denies any psychotic or manic symptoms.  Denies suicidal or homicidal ideation but may be under reporting suicidal thoughts.  I did attempt to discuss initiating a trial of antidepressant medication to help " "alleviate her depression and anxiety symptoms.  Patient has never been on medications in the past and was somewhat wary about discussing this topic with me.  She reported that she would like to discuss this with her mother before initiating medications.  Later approach this writer and informed me that she did not intend to take any medications due to concerns about side effects.  She also requested to be discharged from the hospital reporting that she felt \"fine \".  Patient is currently here voluntary status but she should be placed on a 72 hour hold if she attempts to leave A due to her risk of suicide.              Problems taking medications regularly:  None       Medication changes since discharge: None       Problems adhering to non-medication therapy:  None    Going back to school tomorrow or Monday.   Talked to professors.   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  Sarah therapist at [facility].   Going to [place]    Declines further medication management at this time  Plans to continue outpatient psychotherapy.     Summary of hospitalization:  CareEverywhere information obtained and reviewed  Diagnostic Tests/Treatments reviewed.  Follow up needed: Psychiatry and psychotherapy  Other Healthcare Providers Involved in Patient s Care:         None  Update since discharge: worsened.     Post Discharge Medication Reconciliation: discharge medications reconciled, continue medications without change.  Plan of care communicated with patient     Coding guidelines for this visit:  Type of Medical   Decision Making Face-to-Face Visit       within 7 Days of discharge Face-to-Face Visit        within 14 days of discharge   Moderate Complexity [id number]   High Complexity [id number]              -------------------------------------    Problem list and histories reviewed & adjusted, as indicated.  Additional history: as documented    Labs reviewed in EPIC    Reviewed " and updated as needed this visit by clinical staff  Allergies  Meds       Reviewed and updated as needed this visit by Provider         ROS:   ROS: 10 point ROS neg other than the symptoms noted above in the HPI.      OBJECTIVE:                                                    /62  Pulse 80  Temp 97.3  F (36.3  C) (Tympanic)  Resp 14  Wt 181 lb 9.6 oz (82.4 kg)  LMP 05/08/2018  SpO2 99%  BMI 31.17 kg/m2  Body mass index is 31.17 kg/(m^2).   GENERAL: healthy, alert, well nourished, well hydrated, no distress  HENT: ear canals- normal; TMs- normal; Nose- normal; Mouth- no ulcers, no lesions  NECK: no tenderness, no adenopathy, no asymmetry, no masses, no stiffness; thyroid- normal to palpation  RESP: lungs clear to auscultation - no rales, no rhonchi, no wheezes  CV: regular rates and rhythm, normal S1 S2, no S3 or S4 and no murmur, no click or rub -  ABDOMEN: soft, no tenderness, no  hepatosplenomegaly, no masses, normal bowel sounds  MS: extremities- no gross deformities noted, no edema  NEURO: strength and tone- normal, sensory exam- grossly normal, mentation- intact, speech- normal, reflexes- symmetric  PSYCH: Alert and oriented times 3; speech- coherent , normal rate and volume; able to articulate logical thoughts, able to abstract reason, no tangential thoughts, no hallucinations or delusions, affect- normal    Diagnostic test results:  Results for orders placed or performed in visit on 05/10/18   Treponema Abs w Reflex to RPR and Titer   Result Value Ref Range    Treponema Antibodies Nonreactive NR^Nonreactive   Hepatitis C antibody   Result Value Ref Range    Hepatitis C Antibody Nonreactive NR^Nonreactive   Hepatitis B Surface Antibody   Result Value Ref Range    Hepatitis B Surface Antibody 0.65 <8.00 m[IU]/mL   Hepatitis B core antibody   Result Value Ref Range    Hepatitis B Core Genna Nonreactive NR^Nonreactive   HIV Antigen Antibody Combo   Result Value Ref Range    HIV Antigen Antibody  Combo Nonreactive NR^Nonreactive       Herpes Simplex Virus 1 and 2 IgG   Result Value Ref Range    Herpes Simplex Virus Type 1 IgG >8.0 (H) 0.0 - 0.8 AI    Herpes Simplex Virus Type 2 IgG <0.2 0.0 - 0.8 AI   Chlamydia trachomatis PCR   Result Value Ref Range    Specimen Description Vagina     Chlamydia Trachomatis PCR Negative NEG^Negative   Neisseria gonorrhoeae PCR   Result Value Ref Range    Specimen Descrip Vagina     N Gonorrhea PCR Negative NEG^Negative   Wet prep   Result Value Ref Range    Specimen Description Vagina     Wet Prep       No Trichomonas seen  No clue cells seen  No yeast seen          ASSESSMENT/PLAN:                                                    1. Mood disorder (H)  Willing to try mood stabilizer at low dose.  Needing something to help with sleep as well.   Follow up with psychiatry and psychotherapy strongly recommended.   Follow up here in clinic in 1 months.   - OLANZapine (ZYPREXA) 2.5 MG tablet; Take 1 tablet (2.5 mg) by mouth At Bedtime  Dispense: 30 tablet; Refill: 2  - hydrOXYzine (ATARAX) 25 MG tablet; Take 1-2 tablets (25-50 mg) by mouth every 6 hours as needed for anxiety  Dispense: 31 tablet; Refill: 2  SE reviewed    2. Screen for STD (sexually transmitted disease)  Done today.   - Chlamydia trachomatis PCR  - Neisseria gonorrhoeae PCR  - Treponema Abs w Reflex to RPR and Titer  - Hepatitis C antibody  - Hepatitis B Surface Antibody  - Hepatitis B core antibody  - HIV Antigen Antibody Combo  - Herpes Simplex Virus 1 and 2 IgG  - Wet prep    3. Counseling for birth control, oral contraceptives  Begin ORAL BIRTH CONTROL   - norgestimate-ethinyl estradiol (ORTHO-CYCLEN, SPRINTEC) 0.25-35 MG-MCG per tablet; Take 1 tablet by mouth daily  Dispense: 90 tablet; Refill: 3      Follow up with Provider - 1 month.  Call or return to the clinic with any worsening of symptoms or no resolution. Patient/Parent verbalized understanding and is in agreement. Medication side effects reviewed.    Current Outpatient Prescriptions   Medication Sig Dispense Refill     acyclovir (ZOVIRAX) 5 % ointment Apply topically 6 times daily 30 g 2     clotrimazole (LOTRIMIN) 1 % cream Apply topically 2 times daily 30 g 1     hydrOXYzine (ATARAX) 25 MG tablet Take 1-2 tablets (25-50 mg) by mouth every 6 hours as needed for anxiety 31 tablet 2     norgestimate-ethinyl estradiol (ORTHO-CYCLEN, SPRINTEC) 0.25-35 MG-MCG per tablet Take 1 tablet by mouth daily 90 tablet 3     OLANZapine (ZYPREXA) 2.5 MG tablet Take 1 tablet (2.5 mg) by mouth At Bedtime 30 tablet 2        See Patient Instructions    VERONICA Mac [facility]

## 2018-05-10 NOTE — NURSING NOTE
"Chief Complaint   Patient presents with     Hospital F/U       Initial There were no vitals taken for this visit. Estimated body mass index is 31.58 kg/(m^2) as calculated from the following:    Height as of 1/18/17: 5' 4\" (1.626 m).    Weight as of 7/7/17: 184 lb (83.5 kg).      Health Maintenance that is potentially due pending provider review:  NONE    Possibly completing today per provider review.    Is there anyone who you would like to be able to receive your results? No  If yes have patient fill out ALIDA    "

## 2018-05-10 NOTE — MR AVS SNAPSHOT
After Visit Summary   5/10/2018    Chani Martins    MRN: [id number]           Patient Information     Date Of Birth          1995        Visit Information        Provider Department      5/10/2018 1:00 PM Kellie Echeverria APRN CNP [facility]        Today's Diagnoses     Screen for STD (sexually transmitted disease)    -  1    Mood disorder (H)        Counseling for birth control, oral contraceptives          Care Instructions    Contract for safety with family and friends  See psychiatry  Continue therapy    Psychiatry medication management services  [facility] [phone]    If in Crisis you may contact   [facility]  [phone]  or  [facility]  [phone]                    Follow-ups after your visit        Who to contact     If you have questions or need follow up information about today's clinic visit or your schedule please contact [facility] directly at [phone].  Normal or non-critical lab and imaging results will be communicated to you by MyChart, letter or phone within 4 business days after the clinic has received the results. If you do not hear from us within 7 days, please contact the clinic through MyChart or phone. If you have a critical or abnormal lab result, we will notify you by phone as soon as possible.  Submit refill requests through [employer] or call your pharmacy and they will forward the refill request to us. Please allow 3 business days for your refill to be completed.          Additional Information About Your Visit        Care EveryWhere ID     This is your Care EveryWhere ID. This could be used by other organizations to access your [facility] medical records  [id number]        Your Vitals Were     Pulse Temperature Respirations Last Period Pulse Oximetry BMI (Body Mass Index)    80 97.3  F (36.3  C) (Tympanic) 14 05/08/2018 99% 31.17 kg/m2       Blood Pressure from Last  3 Encounters:   05/10/18 118/62   07/07/17 114/72   01/18/17 122/62    Weight from Last 3 Encounters:   05/10/18 181 lb 9.6 oz (82.4 kg)   07/07/17 184 lb (83.5 kg) (95 %)*   01/18/17 190 lb (86.2 kg) (96 %)*     * Growth percentiles are based on [facility] 2-20 Years data.              We Performed the Following     Chlamydia trachomatis PCR     Hepatitis B core antibody     Hepatitis B Surface Antibody     Hepatitis C antibody     Herpes Simplex Virus 1 and 2 IgG     HIV Antigen Antibody Combo     Neisseria gonorrhoeae PCR     Treponema Abs w Reflex to RPR and Titer     Wet prep          Today's Medication Changes          These changes are accurate as of 5/10/18  2:15 PM.  If you have any questions, ask your nurse or doctor.               Start taking these medicines.        Dose/Directions    hydrOXYzine 25 MG tablet   Commonly known as:  ATARAX   Used for:  Mood disorder (H)   Started by:  Kellie Echeverria APRN CNP        Dose:  25-50 mg   Take 1-2 tablets (25-50 mg) by mouth every 6 hours as needed for anxiety   Quantity:  31 tablet   Refills:  2       OLANZapine 2.5 MG tablet   Commonly known as:  zyPREXA   Used for:  Mood disorder (H)   Started by:  Kellie Echeverria APRN CNP        Dose:  2.5 mg   Take 1 tablet (2.5 mg) by mouth At Bedtime   Quantity:  30 tablet   Refills:  2            Where to get your medicines      These medications were sent to [facility] [address] [address]     Phone:  [phone]     hydrOXYzine 25 MG tablet    norgestimate-ethinyl estradiol 0.25-35 MG-MCG per tablet    OLANZapine 2.5 MG tablet                Primary Care Provider Office Phone # Fax #    VERONICA Mac [phone]       [address] [place] [address]        Equal Access to Services     SALVATORE ESPINOZA AH: Sonia johnson Soeugene, waaxda luqadaha, qaybta kaaljorge tapia  lajoleen sanders. So [facility] [phone].    ATENCIÓN: Si habla vinay, tiene a kerns disposición servicios gratuitos de asistencia lingüística. Zak saenz [phone].    We comply with applicable federal civil rights laws and [place] laws. We do not discriminate on the basis of race, color, national origin, age, disability, sex, sexual orientation, or gender identity.            Thank you!     Thank you for choosing [facility]  for your care. Our goal is always to provide you with excellent care. Hearing back from our patients is one way we can continue to improve our services. Please take a few minutes to complete the written survey that you may receive in the mail after your visit with us. Thank you!             Your Updated Medication List - Protect others around you: Learn how to safely use, store and throw away your medicines at www.disposemymeds.org.          This list is accurate as of 5/10/18  2:15 PM.  Always use your most recent med list.                   Brand Name Dispense Instructions for use Diagnosis    acyclovir 5 % ointment    ZOVIRAX    30 g    Apply topically 6 times daily    Recurrent cold sores       clotrimazole 1 % cream    LOTRIMIN    30 g    Apply topically 2 times daily    Intertrigo       hydrOXYzine 25 MG tablet    ATARAX    31 tablet    Take 1-2 tablets (25-50 mg) by mouth every 6 hours as needed for anxiety    Mood disorder (H)       norgestimate-ethinyl estradiol 0.25-35 MG-MCG per tablet    ORTHO-CYCLEN, SPRINTEC    90 tablet    Take 1 tablet by mouth daily    Counseling for birth control, oral contraceptives       OLANZapine 2.5 MG tablet    zyPREXA    30 tablet    Take 1 tablet (2.5 mg) by mouth At Bedtime    Mood disorder (H)

## 2018-05-10 NOTE — PATIENT INSTRUCTIONS
Contract for safety with family and friends  See psychiatry  Continue therapy    Psychiatry medication management services  [facility] [phone]    If in Crisis you may contact   [facility]  [phone]  or  [facility]  [phone]

## 2018-05-11 LAB
C TRACH DNA SPEC QL NAA+PROBE: NEGATIVE
HBV CORE AB SERPL QL IA: NONREACTIVE
HBV SURFACE AB SERPL IA-ACNC: 0.65 M[IU]/ML
HCV AB SERPL QL IA: NONREACTIVE
HIV 1+2 AB+HIV1 P24 AG SERPL QL IA: NONREACTIVE
HSV1 IGG SERPL QL IA: >8 AI (ref 0–0.8)
HSV2 IGG SERPL QL IA: <0.2 AI (ref 0–0.8)
N GONORRHOEA DNA SPEC QL NAA+PROBE: NEGATIVE
SPECIMEN SOURCE: NORMAL
SPECIMEN SOURCE: NORMAL
T PALLIDUM AB SER QL: NONREACTIVE

## 2019-02-12 ENCOUNTER — OFFICE VISIT (OUTPATIENT)
Dept: FAMILY MEDICINE | Facility: CLINIC | Age: 24
End: 2019-02-12
Payer: MEDICAID

## 2019-02-12 VITALS
SYSTOLIC BLOOD PRESSURE: 122 MMHG | HEIGHT: 64 IN | HEART RATE: 72 BPM | TEMPERATURE: 98 F | WEIGHT: 188 LBS | DIASTOLIC BLOOD PRESSURE: 62 MMHG | BODY MASS INDEX: 32.1 KG/M2

## 2019-02-12 DIAGNOSIS — Z11.3 SCREEN FOR STD (SEXUALLY TRANSMITTED DISEASE): ICD-10-CM

## 2019-02-12 DIAGNOSIS — B96.89 BACTERIAL VAGINOSIS: ICD-10-CM

## 2019-02-12 DIAGNOSIS — N94.6 DYSMENORRHEA: ICD-10-CM

## 2019-02-12 DIAGNOSIS — F39 MOOD DISORDER (H): ICD-10-CM

## 2019-02-12 DIAGNOSIS — Z00.01 ENCOUNTER FOR ROUTINE ADULT PHYSICAL EXAM WITH ABNORMAL FINDINGS: Primary | ICD-10-CM

## 2019-02-12 DIAGNOSIS — Z12.4 SCREENING FOR CERVICAL CANCER: ICD-10-CM

## 2019-02-12 DIAGNOSIS — N76.0 BACTERIAL VAGINOSIS: ICD-10-CM

## 2019-02-12 LAB
SPECIMEN SOURCE: ABNORMAL
WET PREP SPEC: ABNORMAL

## 2019-02-12 PROCEDURE — 87591 N.GONORRHOEAE DNA AMP PROB: CPT | Performed by: NURSE PRACTITIONER

## 2019-02-12 PROCEDURE — G0145 SCR C/V CYTO,THINLAYER,RESCR: HCPCS | Performed by: NURSE PRACTITIONER

## 2019-02-12 PROCEDURE — 87210 SMEAR WET MOUNT SALINE/INK: CPT | Performed by: NURSE PRACTITIONER

## 2019-02-12 PROCEDURE — 99395 PREV VISIT EST AGE 18-39: CPT | Performed by: NURSE PRACTITIONER

## 2019-02-12 PROCEDURE — 87491 CHLMYD TRACH DNA AMP PROBE: CPT | Performed by: NURSE PRACTITIONER

## 2019-02-12 PROCEDURE — 99213 OFFICE O/P EST LOW 20 MIN: CPT | Mod: 25 | Performed by: NURSE PRACTITIONER

## 2019-02-12 RX ORDER — OLANZAPINE 2.5 MG/1
2.5 TABLET, FILM COATED ORAL AT BEDTIME
Qty: 90 TABLET | Refills: 1 | Status: SHIPPED | OUTPATIENT
Start: 2019-02-12 | End: 2019-03-05

## 2019-02-12 RX ORDER — ACETAMINOPHEN AND CODEINE PHOSPHATE 120; 12 MG/5ML; MG/5ML
0.35 SOLUTION ORAL DAILY
Qty: 84 TABLET | Refills: 4 | Status: SHIPPED | OUTPATIENT
Start: 2019-02-12 | End: 2019-04-21

## 2019-02-12 RX ORDER — METRONIDAZOLE 500 MG/1
500 TABLET ORAL 2 TIMES DAILY
Qty: 14 TABLET | Refills: 0 | Status: SHIPPED | OUTPATIENT
Start: 2019-02-12 | End: 2019-03-05

## 2019-02-12 ASSESSMENT — ENCOUNTER SYMPTOMS
JOINT SWELLING: 0
HEARTBURN: 0
SHORTNESS OF BREATH: 0
ARTHRALGIAS: 0
DIZZINESS: 0
DYSURIA: 0
HEMATOCHEZIA: 0
FEVER: 0
DIARRHEA: 0
SORE THROAT: 0
NAUSEA: 0
CONSTIPATION: 0
MYALGIAS: 0
PALPITATIONS: 0
CHILLS: 0
HEADACHES: 0
PARESTHESIAS: 0
WEAKNESS: 0
FREQUENCY: 0
EYE PAIN: 0
NERVOUS/ANXIOUS: 0
COUGH: 0
HEMATURIA: 0
BREAST MASS: 0
ABDOMINAL PAIN: 0

## 2019-02-12 ASSESSMENT — PAIN SCALES - GENERAL: PAINLEVEL: NO PAIN (0)

## 2019-02-12 ASSESSMENT — MIFFLIN-ST. JEOR: SCORE: 1602.76

## 2019-02-12 NOTE — PROGRESS NOTES
SUBJECTIVE:   CC: Chani Martins is an 23 year old woman who presents for preventive health visit.     Physical   Annual:     Getting at least 3 servings of Calcium per day:  NO    Bi-annual eye exam:  Yes    Dental care twice a year:  NO    Sleep apnea or symptoms of sleep apnea:  None    Diet:  Regular (no restrictions)    Frequency of exercise:  None    Taking medications regularly:  Yes    Medication side effects:  None    Additional concerns today:  No    PHQ-2 Total Score: 1    Sexually active one partner stopped oral birth control pills due to mood disturbance  Would like to continue on the Zyprexa.  Needs refills.  Would like to go on oral birth control pills without estrogen.  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    -------------------------------------    Today's PHQ-2 Score:   PHQ-2 ( 1999 Pf[employer]) 2/12/2019   Q1: Little interest or pleasure in doing things 0   Q2: Feeling down, depressed or hopeless 1   PHQ-2 Score 1   Q1: Little interest or pleasure in doing things Not at all   Q2: Feeling down, depressed or hopeless Several days   PHQ-2 Score 1       Abuse: Current or Past(Physical, Sexual or Emotional)- No  Do you feel safe in your environment? Yes    Social History     Tobacco Use     Smoking status: Never Smoker     Smokeless tobacco: Never Used   Substance Use Topics     Alcohol use: No     Alcohol/week: 0.0 oz     Alcohol Use 2/12/2019   If you drink alcohol do you typically have greater than 3 drinks per day OR greater than 7 drinks per week? No       Reviewed orders with patient.  Reviewed health maintenance and updated orders accordingly - Yes  Labs reviewed in EPIC    Mammogram not appropriate for this patient based on age.    Pertinent mammograms are reviewed under the imaging tab.  History of abnormal Pap smear: NO - age 21-29 PAP every 3 years recommended     Reviewed and updated as  "needed this visit by clinical staff  Tobacco  Allergies  Meds         Reviewed and updated as needed this visit by Provider            Review of Systems   Constitutional: Negative for chills and fever.   HENT: Positive for congestion. Negative for ear pain, hearing loss and sore throat.    Eyes: Negative for pain and visual disturbance.   Respiratory: Negative for cough and shortness of breath.    Cardiovascular: Negative for chest pain, palpitations and peripheral edema.   Gastrointestinal: Negative for abdominal pain, constipation, diarrhea, heartburn, hematochezia and nausea.   Breasts:  Negative for tenderness, breast mass and discharge.   Genitourinary: Positive for pelvic pain. Negative for dysuria, frequency, genital sores, hematuria, urgency, vaginal bleeding and vaginal discharge.   Musculoskeletal: Negative for arthralgias, joint swelling and myalgias.   Skin: Negative for rash.   Neurological: Negative for dizziness, weakness, headaches and paresthesias.   Psychiatric/Behavioral: Negative for mood changes. The patient is not nervous/anxious.           OBJECTIVE:   /62   Pulse 72   Temp 98  F (36.7  C) (Tympanic)   Ht 1.626 m (5' 4\")   Wt 85.3 kg (188 lb)   LMP 01/22/2019   BMI 32.27 kg/m    Physical Exam  GENERAL: healthy, alert and no distress multiple facial piercings present  EYES: Eyes grossly normal to inspection, PERRL and conjunctivae and sclerae normal  HENT: ear canals and TM's normal, nose and mouth without ulcers or lesions  NECK: no adenopathy, no asymmetry, masses, or scars and thyroid normal to palpation  RESP: lungs clear to auscultation - no rales, rhonchi or wheezes  BREAST: normal without masses, tenderness or nipple discharge and no palpable axillary masses or adenopathy  CV: regular rate and rhythm, normal S1 S2, no S3 or S4, no murmur, click or rub, no peripheral edema and peripheral pulses strong  ABDOMEN: soft, nontender, no hepatosplenomegaly, no masses and bowel " "sounds normal   (female): normal female external genitalia, normal urethral meatus, vaginal mucosa pink, moist, well rugated, and normal cervix/adnexa/uterus without masses ++ white chunky discharge  MS: no gross musculoskeletal defects noted, no edema  SKIN: no suspicious lesions or rashes  NEURO: Normal strength and tone, mentation intact and speech normal  PSYCH: mentation appears normal, affect normal/bright    Results for orders placed or performed in visit on 02/12/19   Wet prep   Result Value Ref Range    Specimen Description Vagina     Wet Prep No Trichomonas seen     Wet Prep Clue cells seen (A)     Wet Prep No yeast seen        ASSESSMENT/PLAN:   Chani was seen today for physical.    Diagnoses and all orders for this visit:    Encounter for routine adult physical exam with abnormal findings    Screening for cervical cancer  -     PAP imaged thin layer screen only - recommended age 21 - 24 years    Screen for STD (sexually transmitted disease)  -     Wet prep  -     NEISSERIA GONORRHOEA PCR  -     CHLAMYDIA TRACHOMATIS PCR    Dysmenorrhea  -     norethindrone (MICRONOR) 0.35 MG tablet; Take 1 tablet (0.35 mg) by mouth daily    Mood disorder (H)  -     OLANZapine (ZYPREXA) 2.5 MG tablet; Take 1 tablet (2.5 mg) by mouth At Bedtime    Bacterial vaginosis      Restart Zyprexa 2.5 mg at at bedtime  Metronidazole for bacterial vaginosis.  Repeat wet prep if needed may do telephone visit  Restart birth control Micronor same time daily  STD prevention strategies reviewed    COUNSELING:  Reviewed preventive health counseling, as reflected in patient instructions    BP Readings from Last 1 Encounters:   02/12/19 122/62     Estimated body mass index is 32.27 kg/m  as calculated from the following:    Height as of this encounter: 1.626 m (5' 4\").    Weight as of this encounter: 85.3 kg (188 lb).      Weight management plan: Discussed healthy diet and exercise guidelines     reports that  has never smoked. she has " never used smokeless tobacco.      Counseling Resources:  ATP IV Guidelines  Pooled Cohorts Equation Calculator  Breast Cancer Risk Calculator  FRAX Risk Assessment  ICSI Preventive Guidelines  Dietary Guidelines for Americans, 2010  USDA's MyPlate  ASA Prophylaxis  Lung CA Screening  Call or return to the clinic with any worsening of symptoms or no resolution. Patient/Parent verbalized understanding and is in agreement. Medication side effects reviewed.   Current Outpatient Medications   Medication Sig Dispense Refill     hydrOXYzine (ATARAX) 25 MG tablet Take 1-2 tablets (25-50 mg) by mouth every 6 hours as needed for anxiety 31 tablet 2     norethindrone (MICRONOR) 0.35 MG tablet Take 1 tablet (0.35 mg) by mouth daily 84 tablet 4     OLANZapine (ZYPREXA) 2.5 MG tablet Take 1 tablet (2.5 mg) by mouth At Bedtime 90 tablet 1         VERONICA Mac [facility]

## 2019-02-12 NOTE — PATIENT INSTRUCTIONS
Preventive Health Recommendations  Female Ages 21 to 25     Yearly exam:     See your health care provider every year in order to  o Review health changes.   o Discuss preventive care.    o Review your medicines if your doctor has prescribed any.      You should be tested each year for STDs (sexually transmitted diseases).       Talk to your provider about how often you should have cholesterol testing.      Get a Pap test every three years. If you have an abnormal result, your doctor may have you test more often.      If you are at risk for diabetes, you should have a diabetes test (fasting glucose).     Shots:     Get a flu shot each year.     Get a tetanus shot every 10 years.     Consider getting the shot (vaccine) that prevents cervical cancer (Gardasil).    Nutrition:     Eat at least 5 servings of fruits and vegetables each day.    Eat whole-grain bread, whole-wheat pasta and brown rice instead of white grains and rice.    Get adequate Calcium and Vitamin D.     Lifestyle    Exercise at least 150 minutes a week each week (30 minutes a day, 5 days a week). This will help you control your weight and prevent disease.    Limit alcohol to one drink per day.    No smoking.     Wear sunscreen to prevent skin cancer.    See your dentist every six months for an exam and cleaning.

## 2019-02-13 LAB
C TRACH DNA SPEC QL NAA+PROBE: NEGATIVE
N GONORRHOEA DNA SPEC QL NAA+PROBE: NEGATIVE
SPECIMEN SOURCE: NORMAL
SPECIMEN SOURCE: NORMAL

## 2019-02-14 LAB
COPATH REPORT: NORMAL
PAP: NORMAL

## 2019-03-05 ENCOUNTER — TELEPHONE (OUTPATIENT)
Dept: FAMILY MEDICINE | Facility: CLINIC | Age: 24
End: 2019-03-05

## 2019-03-05 DIAGNOSIS — N76.0 BACTERIAL VAGINOSIS: ICD-10-CM

## 2019-03-05 DIAGNOSIS — B96.89 BACTERIAL VAGINOSIS: ICD-10-CM

## 2019-03-05 DIAGNOSIS — F39 MOOD DISORDER (H): ICD-10-CM

## 2019-03-05 RX ORDER — METRONIDAZOLE 500 MG/1
500 TABLET ORAL 2 TIMES DAILY
Qty: 14 TABLET | Refills: 0 | Status: SHIPPED | OUTPATIENT
Start: 2019-03-05 | End: 2019-04-21

## 2019-03-05 RX ORDER — OLANZAPINE 2.5 MG/1
2.5 TABLET, FILM COATED ORAL AT BEDTIME
Qty: 90 TABLET | Refills: 1 | Status: SHIPPED | OUTPATIENT
Start: 2019-03-05 | End: 2019-05-13

## 2019-03-05 NOTE — TELEPHONE ENCOUNTER
Reason for Call:  Other prescription    Detailed comments: Pt was prescribed Olanzapine and Flagy 2/12/19. She did not have insurance so did not pick it up. She went to get it now that she does and [facility] told her they do not have record of these scripts. Can we resend?    Phone Number Patient can be reached at: Home number on file [phone] (home)    Best Time: any    Can we leave a detailed message on this number?     Call taken on 3/5/2019 at 10:56 AM by Gretta Lester

## 2019-04-19 ENCOUNTER — APPOINTMENT (OUTPATIENT)
Dept: OBGYN | Facility: CLINIC | Age: 24
End: 2019-04-19
Payer: COMMERCIAL

## 2019-04-19 ENCOUNTER — TELEPHONE (OUTPATIENT)
Dept: FAMILY MEDICINE | Facility: CLINIC | Age: 24
End: 2019-04-19

## 2019-04-19 NOTE — TELEPHONE ENCOUNTER
Pt had her pre prenatal visit today and the nurse told patient to check with pcp to see if it is ok to quit her Olanzapine cold turkey or switch to something safe for pregnant women.

## 2019-04-21 ENCOUNTER — PRENATAL OFFICE VISIT (OUTPATIENT)
Dept: OBGYN | Facility: CLINIC | Age: 24
End: 2019-04-21

## 2019-04-21 DIAGNOSIS — Z34.00 PRENATAL CARE, FIRST PREGNANCY: ICD-10-CM

## 2019-04-21 PROCEDURE — 99207 ZZC NO CHARGE NURSE ONLY: CPT | Performed by: OBSTETRICS & GYNECOLOGY

## 2019-04-21 RX ORDER — PRENATAL VIT/IRON FUM/FOLIC AC 27MG-0.8MG
1 TABLET ORAL EVERY EVENING
COMMUNITY

## 2019-04-25 ENCOUNTER — TELEPHONE (OUTPATIENT)
Dept: FAMILY MEDICINE | Facility: CLINIC | Age: 24
End: 2019-04-25

## 2019-04-25 NOTE — TELEPHONE ENCOUNTER
Chani spoke to Chloe Gr and was advised not to travel to [facility] due to zica virus. She is about 4 weeks pregnant.  Can a letter be written to the airline for re-inbursement.  She was going to fly [employer] on 6-5-2019. Questions call her.  She will  letter if okayed.    Malgorzata VillalobosAt[facility] Health [facility] Island[facility]  Clinic [facility]

## 2019-05-13 ENCOUNTER — PRENATAL OFFICE VISIT (OUTPATIENT)
Dept: OBGYN | Facility: CLINIC | Age: 24
End: 2019-05-13
Payer: COMMERCIAL

## 2019-05-13 VITALS
BODY MASS INDEX: 32.44 KG/M2 | HEIGHT: 64 IN | SYSTOLIC BLOOD PRESSURE: 122 MMHG | DIASTOLIC BLOOD PRESSURE: 68 MMHG | RESPIRATION RATE: 18 BRPM | TEMPERATURE: 97.8 F | WEIGHT: 190 LBS

## 2019-05-13 DIAGNOSIS — Z34.01 ENCOUNTER FOR PRENATAL CARE IN FIRST TRIMESTER OF FIRST PREGNANCY: Primary | ICD-10-CM

## 2019-05-13 LAB
ABO + RH BLD: NORMAL
ABO + RH BLD: NORMAL
ALBUMIN UR-MCNC: 30 MG/DL
APPEARANCE UR: CLEAR
BACTERIA #/AREA URNS HPF: ABNORMAL /HPF
BILIRUB UR QL STRIP: ABNORMAL
BLD GP AB SCN SERPL QL: NORMAL
BLOOD BANK CMNT PATIENT-IMP: NORMAL
COLOR UR AUTO: YELLOW
ERYTHROCYTE [DISTWIDTH] IN BLOOD BY AUTOMATED COUNT: 17.9 % (ref 10–15)
GLUCOSE UR STRIP-MCNC: NEGATIVE MG/DL
HCT VFR BLD AUTO: 36.3 % (ref 35–47)
HGB BLD-MCNC: 11.5 G/DL (ref 11.7–15.7)
HGB UR QL STRIP: NEGATIVE
KETONES UR STRIP-MCNC: NEGATIVE MG/DL
LEUKOCYTE ESTERASE UR QL STRIP: NEGATIVE
MCH RBC QN AUTO: 26.1 PG (ref 26.5–33)
MCHC RBC AUTO-ENTMCNC: 31.7 G/DL (ref 31.5–36.5)
MCV RBC AUTO: 82 FL (ref 78–100)
MUCOUS THREADS #/AREA URNS LPF: PRESENT /LPF
NITRATE UR QL: NEGATIVE
NON-SQ EPI CELLS #/AREA URNS LPF: ABNORMAL /LPF
PH UR STRIP: 6 PH (ref 5–7)
PLATELET # BLD AUTO: 273 10E9/L (ref 150–450)
RBC # BLD AUTO: 4.41 10E12/L (ref 3.8–5.2)
RBC #/AREA URNS AUTO: ABNORMAL /HPF
SOURCE: ABNORMAL
SP GR UR STRIP: >1.03 (ref 1–1.03)
SPECIMEN EXP DATE BLD: NORMAL
UROBILINOGEN UR STRIP-ACNC: 0.2 EU/DL (ref 0.2–1)
WBC # BLD AUTO: 7.5 10E9/L (ref 4–11)
WBC #/AREA URNS AUTO: ABNORMAL /HPF

## 2019-05-13 PROCEDURE — 36415 COLL VENOUS BLD VENIPUNCTURE: CPT | Performed by: OBSTETRICS & GYNECOLOGY

## 2019-05-13 PROCEDURE — 85027 COMPLETE CBC AUTOMATED: CPT | Performed by: OBSTETRICS & GYNECOLOGY

## 2019-05-13 PROCEDURE — 76817 TRANSVAGINAL US OBSTETRIC: CPT | Performed by: OBSTETRICS & GYNECOLOGY

## 2019-05-13 PROCEDURE — 81001 URINALYSIS AUTO W/SCOPE: CPT | Performed by: OBSTETRICS & GYNECOLOGY

## 2019-05-13 PROCEDURE — 99207 ZZC FIRST OB VISIT: CPT | Performed by: OBSTETRICS & GYNECOLOGY

## 2019-05-13 ASSESSMENT — MIFFLIN-ST. JEOR: SCORE: 1611.83

## 2019-05-13 NOTE — PROGRESS NOTES
Discussed physician coverage, tertiary support, diet, exercise, weight gain, schedule of visits, routine and indicated ultrasounds, childbirth education and antepartum testing for certain birth defects.  Encouraged patient to review contents of Prenatal Breastfeeding Education Toolkit. Offered opportunity to answer questions regarding the importance of skin to skin contact, early initiation of exclusive breastfeeding for the first six months and rooming in while in the hospital.  Discussed [facility] travel advisory for Zika virus.  Syphilis is a sexually transmitted disease that can cause birth defects in the babies of untreated mothers. Every pregnant patient is tested for syphilis early in each pregnancy as part of the routine lab work. The [place] Department of [facility] has seen an increase in the rate of syphilis in [place]. The [facility] now recommends testing for syphilis 3 times during a pregnancy, the new prenatal visit, 28 weeks and when admitted for delivery. Patient accepts lab testing for syphilis.        Options for  testing for birth defects were discussed with the patient, generally including CVS testing, Quad screen serum testing, nuchal lucency/blood marker testing, genetic amniocentesis, Verifi testing  and/or level 2 ultrasound.    Current issues include: conceived on BCP; nausea without emesis    Past medical, surgical, social and family histories reviewed on OB questionaire and included on episode summary.  Pertinent review of systems items stated above. See OB questionaire for pertinent components of HPI.    Past Medical History:   Diagnosis Date     Anemia      Anxiety      Depression      See epic chart    Past Surgical History:   Procedure Laterality Date     BUNIONECTOMY MACKENZIE  2011    Procedure:BUNIONECTOMY MACKENZIE; Subtalar Implant; Surgeon:HELGA URENA; Location:[place]     LENGTHEN TENDON ACHILLES  2011    Procedure:LENGTHEN TENDON ACHILLES; Surgeon:HELGA URENA  "LAY; Location:[place] OR     See epic chart    Patient Active Problem List    Diagnosis Date Noted     Prenatal care, first pregnancy 04/21/2019     Priority: Medium     Mood disorder (H) 04/25/2012     Priority: Medium     Dysmenorrhea 04/25/2012     Priority: Medium     Ankle pain 10/06/2011     Priority: Medium     Acne 08/16/2011     Priority: Medium       OBJECTIVE: /68 (Patient Position: Chair, Cuff Size: Adult Small)   Temp 97.8  F (36.6  C) (Tympanic)   Resp 18   Ht 1.626 m (5' 4\")   Wt 86.2 kg (190 lb)   LMP 03/21/2019   BMI 32.61 kg/m      GENERAL APPEARANCE: healthy, alert and no distress  ABDOMEN:  soft, nontender, no hepato-splenomegaly or hernias  PELVIC:  EGBUS:  within normal limits  VAGINA:  normoestrogenic, well-supported, no unusual discharge  CERVIX:  smooth, non-friable, no gross lesions, thin-layer PAP was not taken  UTERUS:  anteverted and enlarged, 7 weeks size  ADNEXAE:  non-tender, no masses palpable, no cul de sac nodularity     NECK: no adenopathy, no asymmetry, masses, or scars and thyroid normal to palpation     RESP: lungs clear to auscultation , respiratory effort WNL     CV: regular rates and rhythm, normal S1 S2, no S3 or S4 and no murmur, click or rub -     SKIN: no suspicious lesions or rashes     PSYCH: mentation appears normal. and affect normal/bright, oriented to person/place/time     LYMPHATICS: No axillary, cervical, inguinal, or supraclavicular nodes    Transvaginal ultrasound was performed. A live single intrauterine pregnancy was seen.  CRL=1.12 cm, c/w 7 weeks, 1 days.  EDC by sono =12/29/19    Fetal heart motion was visualized. QBY=046 bpm  ASSESSMENT:    ICD-10-CM    1. Encounter for prenatal care in first trimester of first pregnancy Z34.01 US OB > 14 Weeks     CBC with platelets     ABO/Rh type and screen     Hepatitis B surface antigen     Rubella Antibody IgG Quantitative     Urine Culture Aerobic Bacterial     *UA reflex to Microscopic     HIV Antigen " Antibody Combo     US OB <14 Weeks w Transvaginal Single     Urine Drugs of Abuse Screen Panel 13     Treponema Abs w Reflex to RPR and Titer         PLAN: see orders and OB problem list annotations    Margarita Morillo

## 2019-05-14 LAB
HBV SURFACE AG SERPL QL IA: NONREACTIVE
HIV 1+2 AB+HIV1 P24 AG SERPL QL IA: NONREACTIVE
RUBV IGG SERPL IA-ACNC: 48 IU/ML
T PALLIDUM AB SER QL: NONREACTIVE

## 2019-05-16 ENCOUNTER — TELEPHONE (OUTPATIENT)
Dept: OBGYN | Facility: CLINIC | Age: 24
End: 2019-05-16

## 2019-05-16 NOTE — TELEPHONE ENCOUNTER
1st OB completed by Dr. Morillo on 5/13/19. Dr. Morillo is out of office until 5/29/19. Patient currently 8W0D pregnant.    Called patient to notify of Dr. Morillo not being in clinic and to inquire if double shifts are mandated at her place of employment. If letter is written, whom does it need to be addressed to?    Let message for patient to return call to clinic.    Lizbeth Dewitt   [facility]  RN

## 2019-05-16 NOTE — LETTER
[facility]  [address] [place] [address]  [phone]    May 17, 2019      Attn: Nat Brandon, [facility]    RE:Chani Martins                                                                                                               [address] [place] [id number]            To Whom It May Concern:      Chani Martins ( 1995) is a patient here at [facility].  She is no longer able to work double shifts due to pregnancy burdens and fatigue from her pregnancy.      Sincerely,      Elaina Fernandes MD

## 2019-05-16 NOTE — TELEPHONE ENCOUNTER
Reason for Call:  Request for work letter  Detailed comments: Chani is asking if Dr Morillo could write a note for her work that says due to her pregnancy, she should not work double shifts. She needs ASAP and Kellie Echeverria her PCP is out of the office this week. She would like to know if it could be faxed to [place] so she could  because she didn't know her work fax #.     Phone Number Patient can be reached at: Home number on file [phone] (home)    Best Time: anytime    Can we leave a detailed message on this number? YES    Call taken on 5/16/2019 at 11:52 AM by Marilynn Abrams

## 2019-05-16 NOTE — TELEPHONE ENCOUNTER
Pt returns call and she said to address this letter to the Attn: Nat Brandon.  She is mandated to work double shifts at place of employment at [employer].  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  She reports that if she works a double shift she often times falls asleep at the wheel and has found herself in the middle of the median.    She would like this faxed to the [facility] and she will pick it up there.  Please contact her once this letter has been prepared and faxed.    Kaycee Gonzalez  [facility] RN

## 2019-05-17 NOTE — TELEPHONE ENCOUNTER
Pt called letter at  to pick faxed over from Elaina Fernandes MD's office.    Jeanna Newton  [facility]

## 2019-05-17 NOTE — TELEPHONE ENCOUNTER
Letter typed and given to Dr. Delaney to sign.  Then will fax to [facility] and inform pt.    -Radha Leyva  Clinic Station

## 2019-05-17 NOTE — TELEPHONE ENCOUNTER
"I dont know if this has been addressed or not.   But can a letter be generated stating that \"she is no longer able to work double shifts due to pregnancy burdens and fatigue from her pregnancy.\"  I can sign the letter then and it can be faxed to where it needs to be faxed to.   Thanks.     Elaina Fernandes MD  [facility]      "

## 2019-06-10 ENCOUNTER — PRENATAL OFFICE VISIT (OUTPATIENT)
Dept: OBGYN | Facility: CLINIC | Age: 24
End: 2019-06-10
Payer: COMMERCIAL

## 2019-06-10 VITALS
RESPIRATION RATE: 18 BRPM | HEART RATE: 72 BPM | HEIGHT: 64 IN | BODY MASS INDEX: 32.44 KG/M2 | WEIGHT: 190 LBS | DIASTOLIC BLOOD PRESSURE: 78 MMHG | TEMPERATURE: 98.1 F | SYSTOLIC BLOOD PRESSURE: 125 MMHG

## 2019-06-10 DIAGNOSIS — Z34.01 ENCOUNTER FOR PRENATAL CARE IN FIRST TRIMESTER OF FIRST PREGNANCY: Primary | ICD-10-CM

## 2019-06-10 PROCEDURE — 99207 ZZC PRENATAL VISIT: CPT | Performed by: OBSTETRICS & GYNECOLOGY

## 2019-06-10 ASSESSMENT — MIFFLIN-ST. JEOR: SCORE: 1611.83

## 2019-06-10 NOTE — PROGRESS NOTES
"CC: Here for routine prenatal visit @ 11w4d   HPI:  Reviewed all lab results, options for  screening for Down's syndrome--pt prefers QUAD screen    PE: /78 (BP Location: Right arm, Patient Position: Chair, Cuff Size: Adult Large)   Pulse 72   Temp 98.1  F (36.7  C) (Tympanic)   Resp 18   Ht 1.626 m (5' 4\")   Wt 86.2 kg (190 lb)   LMP 2019   BMI 32.61 kg/m     See OB flowsheet      A:  1. Encounter for prenatal care in first trimester of first pregnancy        Routine prenatal care  RTC 4 weeks.      Margarita Morillo M.D.     "

## 2019-07-08 ENCOUNTER — PRENATAL OFFICE VISIT (OUTPATIENT)
Dept: OBGYN | Facility: CLINIC | Age: 24
End: 2019-07-08
Payer: COMMERCIAL

## 2019-07-08 VITALS
WEIGHT: 186 LBS | RESPIRATION RATE: 18 BRPM | SYSTOLIC BLOOD PRESSURE: 121 MMHG | DIASTOLIC BLOOD PRESSURE: 67 MMHG | HEIGHT: 64 IN | TEMPERATURE: 99 F | HEART RATE: 71 BPM | BODY MASS INDEX: 31.76 KG/M2

## 2019-07-08 DIAGNOSIS — Z34.02 ENCOUNTER FOR PRENATAL CARE IN SECOND TRIMESTER OF FIRST PREGNANCY: Primary | ICD-10-CM

## 2019-07-08 PROCEDURE — 99000 SPECIMEN HANDLING OFFICE-LAB: CPT | Performed by: OBSTETRICS & GYNECOLOGY

## 2019-07-08 PROCEDURE — 36415 COLL VENOUS BLD VENIPUNCTURE: CPT | Performed by: OBSTETRICS & GYNECOLOGY

## 2019-07-08 PROCEDURE — 99207 ZZC PRENATAL VISIT: CPT | Performed by: OBSTETRICS & GYNECOLOGY

## 2019-07-08 PROCEDURE — 81511 FTL CGEN ABNOR FOUR ANAL: CPT | Mod: 90 | Performed by: OBSTETRICS & GYNECOLOGY

## 2019-07-08 ASSESSMENT — MIFFLIN-ST. JEOR: SCORE: 1593.69

## 2019-07-08 NOTE — PROGRESS NOTES
"CC: Here for routine prenatal visit @ 15w4d   HPI:  Desires QUAD screen; no complaints; feeling well    PE: /67 (BP Location: Right arm, Patient Position: Chair, Cuff Size: Adult Large)   Pulse 71   Temp 99  F (37.2  C) (Tympanic)   Resp 18   Ht 1.626 m (5' 4\")   Wt 84.4 kg (186 lb)   LMP 03/21/2019   BMI 31.93 kg/m     See OB flowsheet      A:  1. Encounter for prenatal care in second trimester of first pregnancy    - US OB > 14 Weeks; Future  - Maternal Quad Marker 2nd Trimester      Routine prenatal care  RTC 4 weeks.      Margarita Morillo M.D.     "

## 2019-07-11 LAB
# FETUSES US: NORMAL
# FETUSES: NORMAL
AFP ADJ MOM AMN: 1.18
AFP SERPL-MCNC: 31 NG/ML
AGE - REPORTED: 22.2 YR
CURRENT SMOKER: NO
CURRENT SMOKER: NO
DIABETES STATUS PATIENT: NO
FAMILY MEMBER DISEASES HX: NO
FAMILY MEMBER DISEASES HX: NO
GA METHOD: NORMAL
GA METHOD: NORMAL
GA: NORMAL WK
HCG MOM SERPL: 0.83
HCG SERPL-ACNC: NORMAL IU/L
HX OF HEREDITARY DISORDERS: NO
IDDM PATIENT QL: NO
INHIBIN A MOM SERPL: 1.25
INHIBIN A SERPL-MCNC: 204 PG/ML
INTEGRATED SCN PATIENT-IMP: NORMAL
IVF PREGNANCY: NO
LMP START DATE: NORMAL
MONOCHORIONIC TWINS: NO
PATHOLOGY STUDY: NORMAL
PREV FETUS DEFECT: NO
SERVICE CMNT-IMP: NO
SPECIMEN DRAWN SERPL: NORMAL
U ESTRIOL MOM SERPL: 1.15
U ESTRIOL SERPL-MCNC: 0.83 NG/ML
VALPROIC/CARBAMAZEPINE STATUS: NO
WEIGHT UNITS: NORMAL

## 2019-07-23 ENCOUNTER — TELEPHONE (OUTPATIENT)
Dept: OBGYN | Facility: CLINIC | Age: 24
End: 2019-07-23

## 2019-07-23 ENCOUNTER — HOSPITAL ENCOUNTER (EMERGENCY)
Facility: CLINIC | Age: 24
Discharge: HOME | End: 2019-07-23
Attending: NURSE PRACTITIONER | Admitting: NURSE PRACTITIONER
Payer: COMMERCIAL

## 2019-07-23 ENCOUNTER — APPOINTMENT (OUTPATIENT)
Dept: ULTRASOUND IMAGING | Facility: CLINIC | Age: 24
End: 2019-07-23
Attending: NURSE PRACTITIONER
Payer: COMMERCIAL

## 2019-07-23 VITALS
RESPIRATION RATE: 18 BRPM | DIASTOLIC BLOOD PRESSURE: 73 MMHG | TEMPERATURE: 97.5 F | OXYGEN SATURATION: 100 % | HEART RATE: 66 BPM | SYSTOLIC BLOOD PRESSURE: 121 MMHG

## 2019-07-23 DIAGNOSIS — R10.9 ABDOMINAL PAIN DURING PREGNANCY IN FIRST TRIMESTER: ICD-10-CM

## 2019-07-23 DIAGNOSIS — O26.891 ABDOMINAL PAIN DURING PREGNANCY IN FIRST TRIMESTER: ICD-10-CM

## 2019-07-23 LAB
ALBUMIN SERPL-MCNC: 3.3 G/DL (ref 3.4–5)
ALBUMIN UR-MCNC: NEGATIVE MG/DL
ALP SERPL-CCNC: 53 U/L (ref 40–150)
ALT SERPL W P-5'-P-CCNC: 16 U/L (ref 0–50)
ANION GAP SERPL CALCULATED.3IONS-SCNC: 9 MMOL/L (ref 3–14)
APPEARANCE UR: CLEAR
AST SERPL W P-5'-P-CCNC: 18 U/L (ref 0–45)
BASOPHILS # BLD AUTO: 0 10E9/L (ref 0–0.2)
BASOPHILS NFR BLD AUTO: 0.1 %
BILIRUB SERPL-MCNC: 0.4 MG/DL (ref 0.2–1.3)
BILIRUB UR QL STRIP: NEGATIVE
BUN SERPL-MCNC: 9 MG/DL (ref 7–30)
CALCIUM SERPL-MCNC: 8.9 MG/DL (ref 8.5–10.1)
CHLORIDE SERPL-SCNC: 109 MMOL/L (ref 94–109)
CO2 SERPL-SCNC: 22 MMOL/L (ref 20–32)
COLOR UR AUTO: ABNORMAL
CREAT SERPL-MCNC: 0.54 MG/DL (ref 0.52–1.04)
DIFFERENTIAL METHOD BLD: NORMAL
EOSINOPHIL # BLD AUTO: 0.1 10E9/L (ref 0–0.7)
EOSINOPHIL NFR BLD AUTO: 0.6 %
ERYTHROCYTE [DISTWIDTH] IN BLOOD BY AUTOMATED COUNT: 14.7 % (ref 10–15)
GFR SERPL CREATININE-BSD FRML MDRD: >90 ML/MIN/{1.73_M2}
GLUCOSE SERPL-MCNC: 69 MG/DL (ref 70–99)
GLUCOSE UR STRIP-MCNC: NEGATIVE MG/DL
HCT VFR BLD AUTO: 38 % (ref 35–47)
HGB BLD-MCNC: 12.5 G/DL (ref 11.7–15.7)
HGB UR QL STRIP: NEGATIVE
IMM GRANULOCYTES # BLD: 0 10E9/L (ref 0–0.4)
IMM GRANULOCYTES NFR BLD: 0.4 %
KETONES UR STRIP-MCNC: 5 MG/DL
LEUKOCYTE ESTERASE UR QL STRIP: NEGATIVE
LYMPHOCYTES # BLD AUTO: 2.1 10E9/L (ref 0.8–5.3)
LYMPHOCYTES NFR BLD AUTO: 21.9 %
MCH RBC QN AUTO: 28.6 PG (ref 26.5–33)
MCHC RBC AUTO-ENTMCNC: 32.9 G/DL (ref 31.5–36.5)
MCV RBC AUTO: 87 FL (ref 78–100)
MONOCYTES # BLD AUTO: 0.5 10E9/L (ref 0–1.3)
MONOCYTES NFR BLD AUTO: 5.4 %
NEUTROPHILS # BLD AUTO: 6.9 10E9/L (ref 1.6–8.3)
NEUTROPHILS NFR BLD AUTO: 71.6 %
NITRATE UR QL: NEGATIVE
NRBC # BLD AUTO: 0 10*3/UL
NRBC BLD AUTO-RTO: 0 /100
PH UR STRIP: 5 PH (ref 5–7)
PLATELET # BLD AUTO: 256 10E9/L (ref 150–450)
POTASSIUM SERPL-SCNC: 3.6 MMOL/L (ref 3.4–5.3)
PROT SERPL-MCNC: 7.4 G/DL (ref 6.8–8.8)
RBC # BLD AUTO: 4.37 10E12/L (ref 3.8–5.2)
SODIUM SERPL-SCNC: 140 MMOL/L (ref 133–144)
SOURCE: ABNORMAL
SP GR UR STRIP: 1.01 (ref 1–1.03)
UROBILINOGEN UR STRIP-MCNC: 0 MG/DL (ref 0–2)
WBC # BLD AUTO: 9.6 10E9/L (ref 4–11)

## 2019-07-23 PROCEDURE — 99282 EMERGENCY DEPT VISIT SF MDM: CPT | Mod: Z6 | Performed by: NURSE PRACTITIONER

## 2019-07-23 PROCEDURE — 81003 URINALYSIS AUTO W/O SCOPE: CPT | Performed by: NURSE PRACTITIONER

## 2019-07-23 PROCEDURE — 99284 EMERGENCY DEPT VISIT MOD MDM: CPT | Mod: 25 | Performed by: NURSE PRACTITIONER

## 2019-07-23 PROCEDURE — 80053 COMPREHEN METABOLIC PANEL: CPT | Performed by: NURSE PRACTITIONER

## 2019-07-23 PROCEDURE — 76815 OB US LIMITED FETUS(S): CPT

## 2019-07-23 PROCEDURE — 36415 COLL VENOUS BLD VENIPUNCTURE: CPT | Performed by: NURSE PRACTITIONER

## 2019-07-23 PROCEDURE — 85025 COMPLETE CBC W/AUTO DIFF WBC: CPT | Performed by: NURSE PRACTITIONER

## 2019-07-23 ASSESSMENT — ENCOUNTER SYMPTOMS
DIZZINESS: 0
DYSURIA: 0
HEADACHES: 0
FEVER: 0
ABDOMINAL PAIN: 1
CHILLS: 0
SHORTNESS OF BREATH: 0
FLANK PAIN: 0
NAUSEA: 0
FREQUENCY: 0
VOMITING: 0
LIGHT-HEADEDNESS: 0
COUGH: 0
DIARRHEA: 0
HEMATURIA: 0

## 2019-07-23 NOTE — TELEPHONE ENCOUNTER
Reason for Call:  Other call back    Detailed comments: pt calling stating she is having cramping and pain in vaginal area. Started this morning.     Phone Number Patient can be reached at: Home number on file [phone] (home)    Best Time: any     Can we leave a detailed message on this number? YES    Call taken on 7/23/2019 at 12:31 PM by Sara Saldaña

## 2019-07-23 NOTE — ED PROVIDER NOTES
"  History     Chief Complaint   Patient presents with     Abdominal Pain     18 weeks pregnant, no discharge or bleeding.     HPI  Chani Martins is a 23 year old female, 18 weeks pregnant, who presents to the emergency department for evaluation of abdominal pain/\"cramping\". Symptoms started today while she was at work. Denies fever. Denies N/V. Denies vaginal bleeding.    Allergies:  No Known Allergies    Problem List:    Patient Active Problem List    Diagnosis Date Noted     Prenatal care, first pregnancy 04/21/2019     Priority: Medium     Mood disorder (H) 04/25/2012     Priority: Medium     Dysmenorrhea 04/25/2012     Priority: Medium     Ankle pain 10/06/2011     Priority: Medium     Acne 08/16/2011     Priority: Medium        Past Medical History:    Past Medical History:   Diagnosis Date     Anemia      Anxiety      Depression        Past Surgical History:    Past Surgical History:   Procedure Laterality Date     BUNIONECTOMY MACKENZIE  4/22/2011    Procedure:BUNIONECTOMY MACKENZIE; Subtalar Implant; Surgeon:HELGA URENA; Location:[place]     LENGTHEN TENDON ACHILLES  4/22/2011    Procedure:LENGTHEN TENDON ACHILLES; Surgeon:HELGA URENA; Location:[place]       Family History:    Family History   Problem Relation Age of Onset     Other - See Comments Paternal Grandfather               Anemia Mother         with pregnancy     Unknown/Adopted Father         father adopted     Depression Father      Alcoholism Father      Breast Cancer Maternal Grandmother      Diabetes Maternal Grandmother      Depression Maternal Grandmother      Depression Maternal Grandfather         suicide       Social History:  Marital Status:  Single [1]  Social History     Tobacco Use     Smoking status: Never Smoker     Smokeless tobacco: Never Used   Substance Use Topics     Alcohol use: Yes     Alcohol/week: 0.0 oz     Comment: 1-2 drinks weekly-quit with pregnancy     Drug use: No    "     Medications:      Prenatal Vit-Fe Fumarate-FA (PRENATAL MULTIVITAMIN W/IRON) 27-0.8 MG tablet         Review of Systems   Constitutional: Negative for chills and fever.   HENT: Negative for congestion.    Respiratory: Negative for cough and shortness of breath.    Cardiovascular: Negative for chest pain.   Gastrointestinal: Positive for abdominal pain. Negative for diarrhea, nausea and vomiting.   Genitourinary: Negative for dysuria, flank pain, frequency, hematuria, urgency, vaginal bleeding, vaginal discharge and vaginal pain.   Neurological: Negative for dizziness, light-headedness and headaches.   All other systems reviewed and are negative.      Physical Exam   BP: 121/73  Pulse: 66  Temp: 97.5  F (36.4  C)  Resp: 18  SpO2: 100 %      Physical Exam    GENERAL APPEARANCE: healthy, alert and no acute distress. Pleasant and smiling.  RESP: lungs clear to auscultation - no rales, rhonchi or wheezes  CV: regular rates and rhythm, normal S1 S2, no murmur noted  ABDOMEN:  soft, suprapubic tenderness, no HSM or masses and bowel sounds normal.    Pelvic Exam:  Vulva: No external lesions, normal hair distribution, no adenopathy  Vagina: Moist, pink, no abnormal discharge, well rugated, no lesions  Cervix: closed. nulliparous, smooth, pink, no visible lesions        ED Course          Results for orders placed or performed during the hospital encounter of 07/23/19 (from the past 24 hour(s))   UA reflex to Microscopic   Result Value Ref Range    Color Urine Straw     Appearance Urine Clear     Glucose Urine Negative NEG^Negative mg/dL    Bilirubin Urine Negative NEG^Negative    Ketones Urine 5 (A) NEG^Negative mg/dL    Specific Gravity Urine 1.008 1.003 - 1.035    Blood Urine Negative NEG^Negative    pH Urine 5.0 5.0 - 7.0 pH    Protein Albumin Urine Negative NEG^Negative mg/dL    Urobilinogen mg/dL 0.0 0.0 - 2.0 mg/dL    Nitrite Urine Negative NEG^Negative    Leukocyte Esterase Urine Negative NEG^Negative    Source  Midstream Urine    CBC with platelets differential   Result Value Ref Range    WBC 9.6 4.0 - 11.0 10e9/L    RBC Count 4.37 3.8 - 5.2 10e12/L    Hemoglobin 12.5 11.7 - 15.7 g/dL    Hematocrit 38.0 35.0 - 47.0 %    MCV 87 78 - 100 fl    MCH 28.6 26.5 - 33.0 pg    MCHC 32.9 31.5 - 36.5 g/dL    RDW 14.7 10.0 - 15.0 %    Platelet Count 256 150 - 450 10e9/L    Diff Method Automated Method     % Neutrophils 71.6 %    % Lymphocytes 21.9 %    % Monocytes 5.4 %    % Eosinophils 0.6 %    % Basophils 0.1 %    % Immature Granulocytes 0.4 %    Nucleated RBCs 0 0 /100    Absolute Neutrophil 6.9 1.6 - 8.3 10e9/L    Absolute Lymphocytes 2.1 0.8 - 5.3 10e9/L    Absolute Monocytes 0.5 0.0 - 1.3 10e9/L    Absolute Eosinophils 0.1 0.0 - 0.7 10e9/L    Absolute Basophils 0.0 0.0 - 0.2 10e9/L    Abs Immature Granulocytes 0.0 0 - 0.4 10e9/L    Absolute Nucleated RBC 0.0    Comprehensive metabolic panel   Result Value Ref Range    Sodium 140 133 - 144 mmol/L    Potassium 3.6 3.4 - 5.3 mmol/L    Chloride 109 94 - 109 mmol/L    Carbon Dioxide 22 20 - 32 mmol/L    Anion Gap 9 3 - 14 mmol/L    Glucose 69 (L) 70 - 99 mg/dL    Urea Nitrogen 9 7 - 30 mg/dL    Creatinine 0.54 0.52 - 1.04 mg/dL    GFR Estimate >90 >60 mL/min/[1.73_m2]    GFR Estimate If Black >90 >60 mL/min/[1.73_m2]    Calcium 8.9 8.5 - 10.1 mg/dL    Bilirubin Total 0.4 0.2 - 1.3 mg/dL    Albumin 3.3 (L) 3.4 - 5.0 g/dL    Protein Total 7.4 6.8 - 8.8 g/dL    Alkaline Phosphatase 53 40 - 150 U/L    ALT 16 0 - 50 U/L    AST 18 0 - 45 U/L   US OB >14 Weeks Limited wo Fetal Measurement    Narrative    US OB >14 WEEKS LIMITED WITHOUT FETAL MEASUREMENT   7/23/2019 3:11 PM     HISTORY: Cramping, low abdominal pain/cramping (18 weeks pregnant).    COMPARISON: None.    FINDINGS: There is a single, live, intrauterine gestation in  longitudinal lie and cephalic presentation with a fetal heart rate of  142 bpm. There is a posterior placenta, the tip of which is within 1.2  cm of the cervical os.  The cervix measures 4.0 cm in length. Amniotic  fluid is normal visually.    A left-sided stomach is visualized. Four-chambered heart is poorly  visualized. The bladder and kidneys are not well visualized.    The right ovary measures 2.8 x 2.8 x 2.9 cm and contains a  complex-appearing cyst that measures 1.8 cm. The left ovary is not  visualized due to overlying gas.      Impression    IMPRESSION: Single, live, intrauterine gestation with fetal heart rate  of 142 bpm. There is a low-lying placenta.    JENNIFER JOINER MD       Medications - No data to display    Assessments & Plan (with Medical Decision Making)   23 year old female, 18 weeks pregnant, with abdominal cramping that started while she was at work today. No vaginal bleeding.    On exam patient is alert and oriented. NAD. Smiling. Afebrile. Normotensive. No tachycardia. Mild suprapubic abdominal tenderness on exam.  Pelvic exam reveals a closed cervix. No evidence of vaginal bleeding. Labs are unremarkable. US reveals a single, live, intrauterine gestation with fetal heart rate of 142, and low lying placenta. Thre is a complex-appearing cyst that measures 1.8cm. Left ovary not visualized. Reassurance provided. Patient appears comfortable and stable for discharge home. Pt has f/u with OB/Gyn scheduled for 8/7/2019.       I have reviewed the nursing notes.    I have reviewed the findings, diagnosis, plan and need for follow up with the patient.       Medication List      There are no discharge medications for this visit.         Final diagnoses:   Abdominal pain during pregnancy in first trimester       7/23/2019   [facility] EMERGENCY DEPARTMENT     Meseret, VERONICA Hung CNP  07/23/19 [id number]

## 2019-07-23 NOTE — DISCHARGE INSTRUCTIONS
Drink plenty fluids.  Follow-up with OB/GYN on 8/7 as scheduled.  Return to the emergency department for fevers, vomiting, increased abdominal pain, vaginal bleeding, or worse in any way.

## 2019-07-23 NOTE — TELEPHONE ENCOUNTER
"Spoke with patient on the phone.    S-(situation): Middle to lower abdominal cramping that comes and goes. Patient reports \" its hard to explain, feels like it is more on the outer edge of my abdomen.\" patient reports cramping started at 0800 this morning, was coming 1-2 times per hour, now feels like it is coming more frequently. Patient reports pain is 6/10. Patient has not tried any Tylenol or heat. Patient reports she is drinking fluids and eating normal diet. Patient denies nausea or vomiting. Patient denies fever. Patient reports urinary habits \" are the same.\" patient denies any more frequent frequency or urgency. Patient denies dysuria. Patient denies bright red bleeding or leaking of fluid. Patient has not yet experienced quickening. Patient does not have a headache, is not dizzy or lightheaded. Patient is currently driving with her mom states \" I need to know where to go to be seen.\"      B-(background):  at 17w5d. Last office visit 19, next office visit 19. Anomaly screening scheduled.    A-(assessment): abdominal cramping, worsening as the day goes on    R-(recommendations): Patient is currently driving here with mother for further evaluation. Patient advised to go to Urgent Care/ER for further evaluation.     Pt in agreement and reports understanding.    Lizbeth Dewitt   [facility] Clinic  RN      "

## 2019-07-23 NOTE — ED NOTES
Pt reports 18 weeks pregnant.  Pt was at work training in at  sitting down when developed abdominal cramping with no vaginal discharge.  Denies nausea but has anxiety and stress.   .

## 2019-08-07 ENCOUNTER — PRENATAL OFFICE VISIT (OUTPATIENT)
Dept: OBGYN | Facility: CLINIC | Age: 24
End: 2019-08-07
Payer: COMMERCIAL

## 2019-08-07 ENCOUNTER — HOSPITAL ENCOUNTER (OUTPATIENT)
Dept: ULTRASOUND IMAGING | Facility: CLINIC | Age: 24
Discharge: HOME | End: 2019-08-07
Attending: OBSTETRICS & GYNECOLOGY | Admitting: OBSTETRICS & GYNECOLOGY
Payer: COMMERCIAL

## 2019-08-07 VITALS
TEMPERATURE: 97.9 F | SYSTOLIC BLOOD PRESSURE: 112 MMHG | HEIGHT: 64 IN | DIASTOLIC BLOOD PRESSURE: 69 MMHG | WEIGHT: 193 LBS | RESPIRATION RATE: 18 BRPM | HEART RATE: 73 BPM | BODY MASS INDEX: 32.95 KG/M2

## 2019-08-07 DIAGNOSIS — Z34.02 ENCOUNTER FOR PRENATAL CARE IN SECOND TRIMESTER OF FIRST PREGNANCY: Primary | ICD-10-CM

## 2019-08-07 DIAGNOSIS — Z34.02 ENCOUNTER FOR PRENATAL CARE IN SECOND TRIMESTER OF FIRST PREGNANCY: ICD-10-CM

## 2019-08-07 PROCEDURE — 76805 OB US >/= 14 WKS SNGL FETUS: CPT

## 2019-08-07 PROCEDURE — 99207 ZZC PRENATAL VISIT: CPT | Performed by: OBSTETRICS & GYNECOLOGY

## 2019-08-07 ASSESSMENT — MIFFLIN-ST. JEOR: SCORE: 1625.44

## 2019-08-07 NOTE — PROGRESS NOTES
"CC: Here for routine prenatal visit @ 19w6d   HPI:  Had sonogram today--results still pending; discussed hydration, state fair precautions    PE: /69 (BP Location: Right arm, Patient Position: Chair, Cuff Size: Adult Large)   Pulse 73   Temp 97.9  F (36.6  C) (Tympanic)   Resp 18   Ht 1.626 m (5' 4\")   Wt 87.5 kg (193 lb)   LMP 03/21/2019   BMI 33.13 kg/m     See OB flowsheet      A:  1. Encounter for prenatal care in second trimester of first pregnancy        Routine prenatal care  RTC 4 weeks.      Margarita Morillo M.D.     "

## 2019-09-06 ENCOUNTER — TELEPHONE (OUTPATIENT)
Dept: FAMILY MEDICINE | Facility: CLINIC | Age: 24
End: 2019-09-06

## 2019-09-06 DIAGNOSIS — B00.1 RECURRENT COLD SORES: ICD-10-CM

## 2019-09-06 RX ORDER — ACYCLOVIR 50 MG/G
OINTMENT TOPICAL
Qty: 30 G | Refills: 2 | Status: SHIPPED | OUTPATIENT
Start: 2019-09-06

## 2019-09-06 NOTE — TELEPHONE ENCOUNTER
Pt wanting something for treatment of cold-sores. She is 20 weeks pregnant.      Disp Refills Start End MEGHAN   acyclovir (ZOVIRAX) 5 % ointment (Discontinued) 30 g 2 1/18/2017 2/12/2019 No   Sig - Route: Apply topically 6 times daily - Topical   Class: E-Prescribe   Order: [id number]   E-Prescribing Status: Receipt confirmed by pharmacy (1/18/2017  9:01 AM CST)   Printout Tracking     External Result Report

## 2019-09-06 NOTE — TELEPHONE ENCOUNTER
Reason for call:  Patient reporting a symptom    Symptom or request: Chani says she has had a cold sore for 4-5 days and she has tried Abreva which isn't helping. She has gotten Rx in the past for this and wonders if Kellie could prescribe again. She says there are no appointments available.     Duration (how long have symptoms been present): 4-5 days    Have you been treated for this before? Yes    Additional comments: [facility] Mart[facility]    Phone Number patient can be reached at:  Home number on file [phone] (home)    Best Time:  anytime    Can we leave a detailed message on this number:  YES    Call taken on 9/6/2019 at 1:48 PM by Marilynn Abrams

## 2019-09-11 ENCOUNTER — PRENATAL OFFICE VISIT (OUTPATIENT)
Dept: OBGYN | Facility: CLINIC | Age: 24
End: 2019-09-11
Payer: COMMERCIAL

## 2019-09-11 VITALS
HEIGHT: 64 IN | WEIGHT: 202.6 LBS | RESPIRATION RATE: 16 BRPM | DIASTOLIC BLOOD PRESSURE: 62 MMHG | SYSTOLIC BLOOD PRESSURE: 115 MMHG | HEART RATE: 72 BPM | TEMPERATURE: 97.6 F | BODY MASS INDEX: 34.59 KG/M2

## 2019-09-11 DIAGNOSIS — Z34.02 ENCOUNTER FOR PRENATAL CARE IN SECOND TRIMESTER OF FIRST PREGNANCY: Primary | ICD-10-CM

## 2019-09-11 DIAGNOSIS — Z34.02 ENCOUNTER FOR PRENATAL CARE IN SECOND TRIMESTER OF FIRST PREGNANCY: ICD-10-CM

## 2019-09-11 LAB
BASOPHILS # BLD AUTO: 0 10E9/L (ref 0–0.2)
BASOPHILS NFR BLD AUTO: 0.2 %
DIFFERENTIAL METHOD BLD: ABNORMAL
EOSINOPHIL # BLD AUTO: 0.1 10E9/L (ref 0–0.7)
EOSINOPHIL NFR BLD AUTO: 0.7 %
ERYTHROCYTE [DISTWIDTH] IN BLOOD BY AUTOMATED COUNT: 13.6 % (ref 10–15)
GLUCOSE 1H P 50 G GLC PO SERPL-MCNC: 88 MG/DL (ref 60–129)
HCT VFR BLD AUTO: 34.9 % (ref 35–47)
HGB BLD-MCNC: 11.5 G/DL (ref 11.7–15.7)
IMM GRANULOCYTES # BLD: 0.2 10E9/L (ref 0–0.4)
IMM GRANULOCYTES NFR BLD: 1.3 %
LYMPHOCYTES # BLD AUTO: 2.3 10E9/L (ref 0.8–5.3)
LYMPHOCYTES NFR BLD AUTO: 17 %
MCH RBC QN AUTO: 30.3 PG (ref 26.5–33)
MCHC RBC AUTO-ENTMCNC: 33 G/DL (ref 31.5–36.5)
MCV RBC AUTO: 92 FL (ref 78–100)
MONOCYTES # BLD AUTO: 0.8 10E9/L (ref 0–1.3)
MONOCYTES NFR BLD AUTO: 6.1 %
NEUTROPHILS # BLD AUTO: 10 10E9/L (ref 1.6–8.3)
NEUTROPHILS NFR BLD AUTO: 74.7 %
NRBC # BLD AUTO: 0 10*3/UL
NRBC BLD AUTO-RTO: 0 /100
PLATELET # BLD AUTO: 265 10E9/L (ref 150–450)
RBC # BLD AUTO: 3.79 10E12/L (ref 3.8–5.2)
WBC # BLD AUTO: 13.5 10E9/L (ref 4–11)

## 2019-09-11 PROCEDURE — 85027 COMPLETE CBC AUTOMATED: CPT | Performed by: OBSTETRICS & GYNECOLOGY

## 2019-09-11 PROCEDURE — 85004 AUTOMATED DIFF WBC COUNT: CPT | Performed by: OBSTETRICS & GYNECOLOGY

## 2019-09-11 PROCEDURE — 82950 GLUCOSE TEST: CPT | Performed by: OBSTETRICS & GYNECOLOGY

## 2019-09-11 PROCEDURE — 99207 ZZC PRENATAL VISIT: CPT | Performed by: OBSTETRICS & GYNECOLOGY

## 2019-09-11 PROCEDURE — 86780 TREPONEMA PALLIDUM: CPT | Performed by: OBSTETRICS & GYNECOLOGY

## 2019-09-11 PROCEDURE — 36415 COLL VENOUS BLD VENIPUNCTURE: CPT | Performed by: OBSTETRICS & GYNECOLOGY

## 2019-09-11 RX ORDER — BREAST PUMP
1 EACH MISCELLANEOUS DAILY
Qty: 1 EACH | Refills: 0 | Status: SHIPPED | OUTPATIENT
Start: 2019-09-11

## 2019-09-11 RX ORDER — BREAST PUMP
1 EACH MISCELLANEOUS DAILY
Qty: 1 EACH | Refills: 0 | Status: SHIPPED | OUTPATIENT
Start: 2019-09-11 | End: 2019-09-11

## 2019-09-11 ASSESSMENT — MIFFLIN-ST. JEOR: SCORE: 1668.99

## 2019-09-11 NOTE — NURSING NOTE
"Chief Complaint   Patient presents with     Prenatal Care       Initial /62 (BP Location: Right arm, Patient Position: Chair, Cuff Size: Adult Regular)   Pulse 72   Temp 97.6  F (36.4  C) (Tympanic)   Resp 16   Ht 1.626 m (5' 4\")   Wt 91.9 kg (202 lb 9.6 oz)   LMP 03/21/2019   BMI 34.78 kg/m   Estimated body mass index is 34.78 kg/m  as calculated from the following:    Height as of this encounter: 1.626 m (5' 4\").    Weight as of this encounter: 91.9 kg (202 lb 9.6 oz).  Medications and allergies reviewed.    Enrike MARSH CMA ([facility]AMA)    "

## 2019-09-11 NOTE — PROGRESS NOTES
"CC: Here for routine prenatal visit @ 24w6d   HPI:  Feeling well; declines flu shot; RX given for electric breast pump    PE: /62 (BP Location: Right arm, Patient Position: Chair, Cuff Size: Adult Regular)   Pulse 72   Temp 97.6  F (36.4  C) (Tympanic)   Resp 16   Ht 1.626 m (5' 4\")   Wt 91.9 kg (202 lb 9.6 oz)   LMP 03/21/2019   BMI 34.78 kg/m     See OB flowsheet      A:  1. Encounter for prenatal care in second trimester of first pregnancy    - Misc. Devices (BREAST PUMP) MISC; 1 each daily  Dispense: 1 each; Refill: 0      Routine prenatal care  RTC 4 weeks.      Margarita Morillo M.D.     "

## 2019-09-12 LAB — T PALLIDUM AB SER QL: NONREACTIVE

## 2019-09-14 ENCOUNTER — NURSE TRIAGE (OUTPATIENT)
Dept: NURSING | Facility: CLINIC | Age: 24
End: 2019-09-14

## 2019-09-15 NOTE — TELEPHONE ENCOUNTER
Pregnant 25 weeks and having spotting three separate times after intercourse.  Home advice given and patient expressed understanding.    Joselin Curry RN  [facility]      Additional Information    Negative: Passed out (i.e., lost consciousness, collapsed and was not responding)    Negative: Shock suspected (e.g., cold/pale/clammy skin, too weak to stand, low BP, rapid pulse)    Negative: Difficult to awaken or acting confused (e.g., disoriented, slurred speech)    Negative: SEVERE vaginal bleeding (e.g., continuous red blood from vagina, large blood clots)    Negative: [1] SEVERE abdominal pain (e.g., excruciating) AND [2] constant AND [3] present > 1 hour    Negative: Sounds like a life-threatening emergency to the triager    Negative: MILD-MODERATE vaginal bleeding (i.e., small to medium clots; like mild menstrual period)    Negative: Abdominal pain or having contractions    Negative: Leakage of fluid from vagina (or caller thinks she has ruptured her bag of le)    Negative: Baby moving less today (e.g., kick count < 5 in 1 hour or < 10 in 2 hours)    Negative: [1] Pregnant 24-36 weeks () AND [2] pinkish or brownish mucous discharge    Negative: [1] Pregnant 20-23 weeks AND [2] pinkish or brownish mucous discharge    Slight spotting after sexual intercourse (brief episode)    Protocols used: PREGNANCY - VAGINAL BLEEDING GREATER THAN 20 WEEKS [facility]-EDU

## 2019-10-09 ENCOUNTER — PRENATAL OFFICE VISIT (OUTPATIENT)
Dept: OBGYN | Facility: CLINIC | Age: 24
End: 2019-10-09
Payer: COMMERCIAL

## 2019-10-09 VITALS
TEMPERATURE: 98.2 F | DIASTOLIC BLOOD PRESSURE: 70 MMHG | RESPIRATION RATE: 16 BRPM | WEIGHT: 209.7 LBS | HEIGHT: 64 IN | BODY MASS INDEX: 35.8 KG/M2 | SYSTOLIC BLOOD PRESSURE: 127 MMHG | HEART RATE: 91 BPM

## 2019-10-09 DIAGNOSIS — Z23 NEED FOR TDAP VACCINATION: Primary | ICD-10-CM

## 2019-10-09 PROCEDURE — 99207 ZZC PRENATAL VISIT: CPT | Performed by: OBSTETRICS & GYNECOLOGY

## 2019-10-09 ASSESSMENT — MIFFLIN-ST. JEOR: SCORE: 1701.19

## 2019-10-09 NOTE — PROGRESS NOTES
"[facility]   OB/GYN Clinic    CC: Return OB     Subjective:    Chani is a 23 year old  at 28w6d by Patient's last menstrual period was 2019. by 7w1d US who presents for return OB visit. She reports feeling well. Denies uterine cramping, vaginal bleeding or leaking, dysuria. +fetal movement. Had vaginal bleeding after intercourse ~24wks. Has chosen not to have sex since that time, due to concerns about causing more bleeding.     Objective:  /70 (BP Location: Left arm, Patient Position: Chair, Cuff Size: Adult Regular)   Pulse 91   Temp 98.2  F (36.8  C) (Tympanic)   Resp 16   Ht 1.626 m (5' 4\")   Wt 95.1 kg (209 lb 11.2 oz)   LMP 2019   BMI 35.99 kg/m      Estimated body mass index is 35.99 kg/m  as calculated from the following:    Height as of this encounter: 1.626 m (5' 4\").    Weight as of this encounter: 95.1 kg (209 lb 11.2 oz).    Physical Exam:  Gen: Pleasant, talkative female in no apparent distress   Respiratory: breathing comfortably on room air   Cardiac: Warm and well-perfused.   GI: Abd soft and non-tender, gravid  MSK: Grossly normal movement of all four extremities  Psych: mood and affect bright     See OB flowsheet     Assessment/Plan:   23 year old  at 28w6d by LMP of Patient's last menstrual period was 2019. by 7w1d US who presents for follow-up OB visit.   Normal labs.  Thoroughly counseled on indication for the flu and Tdap vaccination. 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     Return to clinic in 2 weeks.     Jes Cantor MD  OB/GYN  10/9/2019        "

## 2019-10-09 NOTE — NURSING NOTE
"Initial /70 (BP Location: Left arm, Patient Position: Chair, Cuff Size: Adult Regular)   Pulse 91   Temp 98.2  F (36.8  C) (Tympanic)   Resp 16   Ht 1.626 m (5' 4\")   Wt 95.1 kg (209 lb 11.2 oz)   LMP 03/21/2019   BMI 35.99 kg/m   Estimated body mass index is 35.99 kg/m  as calculated from the following:    Height as of this encounter: 1.626 m (5' 4\").    Weight as of this encounter: 95.1 kg (209 lb 11.2 oz). .      "

## 2019-10-25 ENCOUNTER — PRENATAL OFFICE VISIT (OUTPATIENT)
Dept: OBGYN | Facility: CLINIC | Age: 24
End: 2019-10-25
Payer: COMMERCIAL

## 2019-10-25 VITALS
TEMPERATURE: 97.9 F | BODY MASS INDEX: 36.37 KG/M2 | WEIGHT: 213 LBS | RESPIRATION RATE: 18 BRPM | SYSTOLIC BLOOD PRESSURE: 128 MMHG | HEIGHT: 64 IN | HEART RATE: 91 BPM | DIASTOLIC BLOOD PRESSURE: 71 MMHG

## 2019-10-25 DIAGNOSIS — Z34.03 ENCOUNTER FOR PRENATAL CARE IN THIRD TRIMESTER OF FIRST PREGNANCY: Primary | ICD-10-CM

## 2019-10-25 PROCEDURE — 99207 ZZC PRENATAL VISIT: CPT | Performed by: OBSTETRICS & GYNECOLOGY

## 2019-10-25 ASSESSMENT — MIFFLIN-ST. JEOR: SCORE: 1711.16

## 2019-10-25 NOTE — PROGRESS NOTES
"CC: Here for routine prenatal visit @ 31w1d   HPI:  Continues to decline flu shot; feeling well; denies contractions    PE: /71 (BP Location: Right arm, Patient Position: Chair, Cuff Size: Adult Large)   Pulse 91   Temp 97.9  F (36.6  C) (Tympanic)   Resp 18   Ht 1.626 m (5' 4\")   Wt 96.6 kg (213 lb)   LMP 03/21/2019   BMI 36.56 kg/m     See OB flowsheet      A:  1. Encounter for prenatal care in third trimester of first pregnancy        Routine prenatal care  RTC 2 weeks.      Margarita Morillo M.D.     "

## 2019-10-25 NOTE — NURSING NOTE
"Initial /71 (BP Location: Right arm, Patient Position: Chair, Cuff Size: Adult Large)   Pulse 91   Temp 97.9  F (36.6  C) (Tympanic)   Resp 18   Ht 1.626 m (5' 4\")   Wt 96.6 kg (213 lb)   LMP 03/21/2019   BMI 36.56 kg/m   Estimated body mass index is 36.56 kg/m  as calculated from the following:    Height as of this encounter: 1.626 m (5' 4\").    Weight as of this encounter: 96.6 kg (213 lb). .      "

## 2019-11-18 ENCOUNTER — PRENATAL OFFICE VISIT (OUTPATIENT)
Dept: OBGYN | Facility: CLINIC | Age: 24
End: 2019-11-18
Payer: COMMERCIAL

## 2019-11-18 VITALS
HEART RATE: 88 BPM | BODY MASS INDEX: 37.22 KG/M2 | WEIGHT: 218 LBS | RESPIRATION RATE: 18 BRPM | SYSTOLIC BLOOD PRESSURE: 117 MMHG | TEMPERATURE: 98 F | HEIGHT: 64 IN | DIASTOLIC BLOOD PRESSURE: 69 MMHG

## 2019-11-18 DIAGNOSIS — Z34.03 ENCOUNTER FOR PRENATAL CARE IN THIRD TRIMESTER OF FIRST PREGNANCY: Primary | ICD-10-CM

## 2019-11-18 PROCEDURE — 99207 ZZC PRENATAL VISIT: CPT | Performed by: OBSTETRICS & GYNECOLOGY

## 2019-11-18 ASSESSMENT — MIFFLIN-ST. JEOR: SCORE: 1733.84

## 2019-11-18 NOTE — PROGRESS NOTES
"CC: Here for routine prenatal visit @ 34w4d   HPI:  Feeling well; normal fetal movement; no contractions    PE: /69 (BP Location: Right arm, Patient Position: Chair, Cuff Size: Adult Large)   Pulse 88   Temp 98  F (36.7  C) (Tympanic)   Resp 18   Ht 1.626 m (5' 4\")   Wt 98.9 kg (218 lb)   LMP 03/21/2019   BMI 37.42 kg/m     See OB flowsheet      A:  1. Encounter for prenatal care in third trimester of first pregnancy        Routine prenatal care  RTC 2 weeks.      Margarita Morillo M.D.     "

## 2019-11-18 NOTE — NURSING NOTE
"Initial /69 (BP Location: Right arm, Patient Position: Chair, Cuff Size: Adult Large)   Pulse 88   Temp 98  F (36.7  C) (Tympanic)   Resp 18   Ht 1.626 m (5' 4\")   Wt 98.9 kg (218 lb)   LMP 03/21/2019   BMI 37.42 kg/m   Estimated body mass index is 37.42 kg/m  as calculated from the following:    Height as of this encounter: 1.626 m (5' 4\").    Weight as of this encounter: 98.9 kg (218 lb). .      "

## 2019-12-02 ENCOUNTER — PRENATAL OFFICE VISIT (OUTPATIENT)
Dept: OBGYN | Facility: CLINIC | Age: 24
End: 2019-12-02
Payer: COMMERCIAL

## 2019-12-02 VITALS
HEART RATE: 97 BPM | HEIGHT: 64 IN | DIASTOLIC BLOOD PRESSURE: 74 MMHG | BODY MASS INDEX: 37.39 KG/M2 | TEMPERATURE: 97.4 F | SYSTOLIC BLOOD PRESSURE: 133 MMHG | RESPIRATION RATE: 18 BRPM | WEIGHT: 219 LBS

## 2019-12-02 DIAGNOSIS — Z34.03 ENCOUNTER FOR PRENATAL CARE IN THIRD TRIMESTER OF FIRST PREGNANCY: Primary | ICD-10-CM

## 2019-12-02 PROCEDURE — 99207 ZZC PRENATAL VISIT: CPT | Performed by: OBSTETRICS & GYNECOLOGY

## 2019-12-02 ASSESSMENT — MIFFLIN-ST. JEOR: SCORE: 1738.38

## 2019-12-02 NOTE — PROGRESS NOTES
"CC: Here for routine prenatal visit @ 36w4d   HPI:  Having some BHC; reviewed s/s of labor, when to call BC    PE: /74 (BP Location: Right arm, Patient Position: Chair, Cuff Size: Adult Large)   Pulse 97   Temp 97.4  F (36.3  C) (Tympanic)   Resp 18   Ht 1.626 m (5' 4\")   Wt 99.3 kg (219 lb)   LMP 03/21/2019   BMI 37.59 kg/m     See OB flowsheet      A:  1. Encounter for prenatal care in third trimester of first pregnancy    - Strep, Group B by PCR      Routine prenatal care  RTC 1 weeks.      Margarita Morillo M.D.     "

## 2019-12-02 NOTE — NURSING NOTE
"Initial /74 (BP Location: Right arm, Patient Position: Chair, Cuff Size: Adult Large)   Pulse 97   Temp 97.4  F (36.3  C) (Tympanic)   Resp 18   Ht 1.626 m (5' 4\")   Wt 99.3 kg (219 lb)   LMP 03/21/2019   BMI 37.59 kg/m   Estimated body mass index is 37.59 kg/m  as calculated from the following:    Height as of this encounter: 1.626 m (5' 4\").    Weight as of this encounter: 99.3 kg (219 lb). .      "

## 2019-12-03 LAB
GP B STREP DNA SPEC QL NAA+PROBE: NEGATIVE
SPECIMEN SOURCE: NORMAL

## 2019-12-09 ENCOUNTER — PRENATAL OFFICE VISIT (OUTPATIENT)
Dept: OBGYN | Facility: CLINIC | Age: 24
End: 2019-12-09
Payer: COMMERCIAL

## 2019-12-09 VITALS
TEMPERATURE: 98.2 F | WEIGHT: 220 LBS | RESPIRATION RATE: 18 BRPM | SYSTOLIC BLOOD PRESSURE: 125 MMHG | HEART RATE: 91 BPM | DIASTOLIC BLOOD PRESSURE: 74 MMHG | HEIGHT: 64 IN | BODY MASS INDEX: 37.56 KG/M2

## 2019-12-09 DIAGNOSIS — Z34.03 ENCOUNTER FOR PRENATAL CARE IN THIRD TRIMESTER OF FIRST PREGNANCY: Primary | ICD-10-CM

## 2019-12-09 PROCEDURE — 99207 ZZC PRENATAL VISIT: CPT | Performed by: OBSTETRICS & GYNECOLOGY

## 2019-12-09 ASSESSMENT — MIFFLIN-ST. JEOR: SCORE: 1742.91

## 2019-12-09 NOTE — NURSING NOTE
"Initial /74 (BP Location: Right arm, Patient Position: Chair, Cuff Size: Adult Large)   Pulse 91   Temp 98.2  F (36.8  C) (Tympanic)   Resp 18   Ht 1.626 m (5' 4\")   Wt 99.8 kg (220 lb)   LMP 03/21/2019   BMI 37.76 kg/m   Estimated body mass index is 37.76 kg/m  as calculated from the following:    Height as of this encounter: 1.626 m (5' 4\").    Weight as of this encounter: 99.8 kg (220 lb). .      "

## 2019-12-09 NOTE — PROGRESS NOTES
"CC: Here for routine prenatal visit @ 37w4d   HPI:  Reviewed s/s of labor, when to call BC; GBS neg    PE: /74 (BP Location: Right arm, Patient Position: Chair, Cuff Size: Adult Large)   Pulse 91   Temp 98.2  F (36.8  C) (Tympanic)   Resp 18   Ht 1.626 m (5' 4\")   Wt 99.8 kg (220 lb)   LMP 03/21/2019   BMI 37.76 kg/m     See OB flowsheet      A:  1. Encounter for prenatal care in third trimester of first pregnancy        Routine prenatal care  RTC 1 weeks.      Margarita Morillo M.D.     "

## 2019-12-16 ENCOUNTER — PRENATAL OFFICE VISIT (OUTPATIENT)
Dept: OBGYN | Facility: CLINIC | Age: 24
End: 2019-12-16
Payer: COMMERCIAL

## 2019-12-16 VITALS
HEART RATE: 94 BPM | DIASTOLIC BLOOD PRESSURE: 73 MMHG | HEIGHT: 64 IN | TEMPERATURE: 98.5 F | WEIGHT: 221 LBS | SYSTOLIC BLOOD PRESSURE: 133 MMHG | RESPIRATION RATE: 18 BRPM | BODY MASS INDEX: 37.73 KG/M2

## 2019-12-16 DIAGNOSIS — Z34.03 ENCOUNTER FOR PRENATAL CARE IN THIRD TRIMESTER OF FIRST PREGNANCY: Primary | ICD-10-CM

## 2019-12-16 PROCEDURE — 99207 ZZC PRENATAL VISIT: CPT | Performed by: OBSTETRICS & GYNECOLOGY

## 2019-12-16 ASSESSMENT — MIFFLIN-ST. JEOR: SCORE: 1747.45

## 2019-12-16 NOTE — PROGRESS NOTES
"CC: Here for routine prenatal visit @ 38w4d   HPI:  Denies contractions or LOF; membranes swept today    PE: /73 (BP Location: Right arm, Patient Position: Chair, Cuff Size: Adult Large)   Pulse 94   Temp 98.5  F (36.9  C) (Tympanic)   Resp 18   Ht 1.626 m (5' 4\")   Wt 100.2 kg (221 lb)   LMP 03/21/2019   BMI 37.93 kg/m     See OB flowsheet      A:  1. Encounter for prenatal care in third trimester of first pregnancy        Routine prenatal care  RTC 1 weeks.      Margarita Morillo M.D.     "

## 2019-12-16 NOTE — NURSING NOTE
"Initial /73 (BP Location: Right arm, Patient Position: Chair, Cuff Size: Adult Large)   Pulse 94   Temp 98.5  F (36.9  C) (Tympanic)   Resp 18   Ht 1.626 m (5' 4\")   Wt 100.2 kg (221 lb)   LMP 03/21/2019   BMI 37.93 kg/m   Estimated body mass index is 37.93 kg/m  as calculated from the following:    Height as of this encounter: 1.626 m (5' 4\").    Weight as of this encounter: 100.2 kg (221 lb). .      "

## 2019-12-31 ENCOUNTER — TELEPHONE (OUTPATIENT)
Dept: OBGYN | Facility: CLINIC | Age: 24
End: 2019-12-31

## 2019-12-31 NOTE — TELEPHONE ENCOUNTER
Return call to pt.  Unable to reach.  Left message for pt to return call to clinic.    Patient has appointment on Thursday 1/2/20 - can discuss options at that time with Dr. Morillo unless patient prefers induction prior to then and then would have to check with on call provider to see what can be done.    Lizbeth Dewitt   Ob/Gyn Clinic  RN

## 2019-12-31 NOTE — TELEPHONE ENCOUNTER
Spoke to patient who will wait until scheduled 1-2-20 appt and will discuss with Provider then. Gretchen Kulkarni RN

## 2019-12-31 NOTE — TELEPHONE ENCOUNTER
Reason for call:  Patient reporting a symptom    Symptom or request: Pt is wondering if she should be scheduling to be induced.  States she is going on 41 weeks and not having any contractions.  Would really like to schedule with Dr. Morillo.    Duration (how long have symptoms been present):     Phone Number patient can be reached at:  Home number on file [phone] (home)    Best Time:  any    Can we leave a detailed message on this number:  YES    Call taken on 12/31/2019 at 2:08 PM by Radha Leyva

## 2020-01-02 ENCOUNTER — PRENATAL OFFICE VISIT (OUTPATIENT)
Dept: OBGYN | Facility: CLINIC | Age: 25
End: 2020-01-02
Payer: COMMERCIAL

## 2020-01-02 ENCOUNTER — HOSPITAL ENCOUNTER (OUTPATIENT)
Dept: ULTRASOUND IMAGING | Facility: CLINIC | Age: 25
Discharge: HOME | End: 2020-01-02
Attending: OBSTETRICS & GYNECOLOGY | Admitting: OBSTETRICS & GYNECOLOGY
Payer: COMMERCIAL

## 2020-01-02 ENCOUNTER — HOSPITAL ENCOUNTER (INPATIENT)
Facility: CLINIC | Age: 25
LOS: 4 days | Discharge: HOME | End: 2020-01-06
Attending: OBSTETRICS & GYNECOLOGY | Admitting: OBSTETRICS & GYNECOLOGY
Payer: COMMERCIAL

## 2020-01-02 VITALS
DIASTOLIC BLOOD PRESSURE: 67 MMHG | SYSTOLIC BLOOD PRESSURE: 128 MMHG | WEIGHT: 226 LBS | BODY MASS INDEX: 38.58 KG/M2 | TEMPERATURE: 99.3 F | RESPIRATION RATE: 18 BRPM | HEART RATE: 102 BPM | HEIGHT: 64 IN

## 2020-01-02 DIAGNOSIS — Z34.03 ENCOUNTER FOR PRENATAL CARE IN THIRD TRIMESTER OF FIRST PREGNANCY: Primary | ICD-10-CM

## 2020-01-02 DIAGNOSIS — Z34.03 ENCOUNTER FOR PRENATAL CARE IN THIRD TRIMESTER OF FIRST PREGNANCY: ICD-10-CM

## 2020-01-02 PROBLEM — Z34.90 ENCOUNTER FOR ELECTIVE INDUCTION OF LABOR: Status: ACTIVE | Noted: 2020-01-02

## 2020-01-02 LAB
ABO + RH BLD: NORMAL
ABO + RH BLD: NORMAL
BASOPHILS # BLD AUTO: 0 10E9/L (ref 0–0.2)
BASOPHILS NFR BLD AUTO: 0.2 %
BLD GP AB SCN SERPL QL: NORMAL
BLOOD BANK CMNT PATIENT-IMP: NORMAL
DIFFERENTIAL METHOD BLD: ABNORMAL
EOSINOPHIL # BLD AUTO: 0.1 10E9/L (ref 0–0.7)
EOSINOPHIL NFR BLD AUTO: 0.5 %
ERYTHROCYTE [DISTWIDTH] IN BLOOD BY AUTOMATED COUNT: 13.7 % (ref 10–15)
HCT VFR BLD AUTO: 32.9 % (ref 35–47)
HGB BLD-MCNC: 10.6 G/DL (ref 11.7–15.7)
IMM GRANULOCYTES # BLD: 0.3 10E9/L (ref 0–0.4)
IMM GRANULOCYTES NFR BLD: 1.8 %
LYMPHOCYTES # BLD AUTO: 1.9 10E9/L (ref 0.8–5.3)
LYMPHOCYTES NFR BLD AUTO: 11.5 %
MCH RBC QN AUTO: 26.9 PG (ref 26.5–33)
MCHC RBC AUTO-ENTMCNC: 32.2 G/DL (ref 31.5–36.5)
MCV RBC AUTO: 84 FL (ref 78–100)
MONOCYTES # BLD AUTO: 1.1 10E9/L (ref 0–1.3)
MONOCYTES NFR BLD AUTO: 6.6 %
NEUTROPHILS # BLD AUTO: 13.2 10E9/L (ref 1.6–8.3)
NEUTROPHILS NFR BLD AUTO: 79.4 %
NRBC # BLD AUTO: 0 10*3/UL
NRBC BLD AUTO-RTO: 0 /100
PLATELET # BLD AUTO: 277 10E9/L (ref 150–450)
RBC # BLD AUTO: 3.94 10E12/L (ref 3.8–5.2)
SPECIMEN EXP DATE BLD: NORMAL
WBC # BLD AUTO: 16.6 10E9/L (ref 4–11)

## 2020-01-02 PROCEDURE — 99221 1ST HOSP IP/OBS SF/LOW 40: CPT | Performed by: OBSTETRICS & GYNECOLOGY

## 2020-01-02 PROCEDURE — 25800030 ZZH RX IP 258 OP 636: Performed by: OBSTETRICS & GYNECOLOGY

## 2020-01-02 PROCEDURE — 85025 COMPLETE CBC W/AUTO DIFF WBC: CPT | Performed by: OBSTETRICS & GYNECOLOGY

## 2020-01-02 PROCEDURE — 25000132 ZZH RX MED GY IP 250 OP 250 PS 637: Performed by: OBSTETRICS & GYNECOLOGY

## 2020-01-02 PROCEDURE — 12000000 ZZH R&B MED SURG/OB

## 2020-01-02 PROCEDURE — 36415 COLL VENOUS BLD VENIPUNCTURE: CPT | Performed by: OBSTETRICS & GYNECOLOGY

## 2020-01-02 PROCEDURE — 86780 TREPONEMA PALLIDUM: CPT | Performed by: OBSTETRICS & GYNECOLOGY

## 2020-01-02 PROCEDURE — 86900 BLOOD TYPING SEROLOGIC ABO: CPT | Performed by: OBSTETRICS & GYNECOLOGY

## 2020-01-02 PROCEDURE — 86850 RBC ANTIBODY SCREEN: CPT | Performed by: OBSTETRICS & GYNECOLOGY

## 2020-01-02 PROCEDURE — 76819 FETAL BIOPHYS PROFIL W/O NST: CPT

## 2020-01-02 PROCEDURE — 86901 BLOOD TYPING SEROLOGIC RH(D): CPT | Performed by: OBSTETRICS & GYNECOLOGY

## 2020-01-02 PROCEDURE — 99207 ZZC PRENATAL VISIT: CPT | Performed by: OBSTETRICS & GYNECOLOGY

## 2020-01-02 RX ORDER — OXYCODONE AND ACETAMINOPHEN 5; 325 MG/1; MG/1
1 TABLET ORAL
Status: DISCONTINUED | OUTPATIENT
Start: 2020-01-02 | End: 2020-01-04

## 2020-01-02 RX ORDER — CARBOPROST TROMETHAMINE 250 UG/ML
250 INJECTION, SOLUTION INTRAMUSCULAR
Status: DISCONTINUED | OUTPATIENT
Start: 2020-01-02 | End: 2020-01-04

## 2020-01-02 RX ORDER — LIDOCAINE 40 MG/G
CREAM TOPICAL
Status: DISCONTINUED | OUTPATIENT
Start: 2020-01-02 | End: 2020-01-04

## 2020-01-02 RX ORDER — OXYTOCIN/0.9 % SODIUM CHLORIDE 30/500 ML
1-24 PLASTIC BAG, INJECTION (ML) INTRAVENOUS CONTINUOUS
Status: CANCELLED | OUTPATIENT
Start: 2020-01-02

## 2020-01-02 RX ORDER — OXYTOCIN/0.9 % SODIUM CHLORIDE 30/500 ML
1-24 PLASTIC BAG, INJECTION (ML) INTRAVENOUS CONTINUOUS
Status: DISCONTINUED | OUTPATIENT
Start: 2020-01-02 | End: 2020-01-04

## 2020-01-02 RX ORDER — TERBUTALINE SULFATE 1 MG/ML
0.25 INJECTION, SOLUTION SUBCUTANEOUS
Status: DISCONTINUED | OUTPATIENT
Start: 2020-01-02 | End: 2020-01-04

## 2020-01-02 RX ORDER — SODIUM CHLORIDE, SODIUM LACTATE, POTASSIUM CHLORIDE, CALCIUM CHLORIDE 600; 310; 30; 20 MG/100ML; MG/100ML; MG/100ML; MG/100ML
INJECTION, SOLUTION INTRAVENOUS CONTINUOUS
Status: CANCELLED | OUTPATIENT
Start: 2020-01-02

## 2020-01-02 RX ORDER — FENTANYL CITRATE 50 UG/ML
50-100 INJECTION, SOLUTION INTRAMUSCULAR; INTRAVENOUS
Status: CANCELLED | OUTPATIENT
Start: 2020-01-02

## 2020-01-02 RX ORDER — HYDROXYZINE HYDROCHLORIDE 50 MG/1
100 TABLET, FILM COATED ORAL
Status: DISCONTINUED | OUTPATIENT
Start: 2020-01-02 | End: 2020-01-04

## 2020-01-02 RX ORDER — MORPHINE SULFATE 10 MG/ML
10 INJECTION, SOLUTION INTRAMUSCULAR; INTRAVENOUS
Status: DISCONTINUED | OUTPATIENT
Start: 2020-01-02 | End: 2020-01-04

## 2020-01-02 RX ORDER — IBUPROFEN 400 MG/1
800 TABLET, FILM COATED ORAL
Status: CANCELLED | OUTPATIENT
Start: 2020-01-02

## 2020-01-02 RX ORDER — IBUPROFEN 800 MG/1
800 TABLET, FILM COATED ORAL
Status: DISCONTINUED | OUTPATIENT
Start: 2020-01-02 | End: 2020-01-04

## 2020-01-02 RX ORDER — SODIUM CHLORIDE, SODIUM LACTATE, POTASSIUM CHLORIDE, CALCIUM CHLORIDE 600; 310; 30; 20 MG/100ML; MG/100ML; MG/100ML; MG/100ML
INJECTION, SOLUTION INTRAVENOUS CONTINUOUS
Status: DISCONTINUED | OUTPATIENT
Start: 2020-01-02 | End: 2020-01-04

## 2020-01-02 RX ORDER — METHYLERGONOVINE MALEATE 0.2 MG/ML
200 INJECTION INTRAVENOUS
Status: DISCONTINUED | OUTPATIENT
Start: 2020-01-02 | End: 2020-01-04

## 2020-01-02 RX ORDER — MISOPROSTOL 100 UG/1
25 TABLET ORAL
Status: CANCELLED | OUTPATIENT
Start: 2020-01-02

## 2020-01-02 RX ORDER — OXYTOCIN 10 [USP'U]/ML
10 INJECTION, SOLUTION INTRAMUSCULAR; INTRAVENOUS
Status: CANCELLED | OUTPATIENT
Start: 2020-01-02

## 2020-01-02 RX ORDER — CARBOPROST TROMETHAMINE 250 UG/ML
250 INJECTION, SOLUTION INTRAMUSCULAR
Status: CANCELLED | OUTPATIENT
Start: 2020-01-02

## 2020-01-02 RX ORDER — NALOXONE HYDROCHLORIDE 0.4 MG/ML
.1-.4 INJECTION, SOLUTION INTRAMUSCULAR; INTRAVENOUS; SUBCUTANEOUS
Status: CANCELLED | OUTPATIENT
Start: 2020-01-02

## 2020-01-02 RX ORDER — OXYTOCIN 10 [USP'U]/ML
10 INJECTION, SOLUTION INTRAMUSCULAR; INTRAVENOUS
Status: DISCONTINUED | OUTPATIENT
Start: 2020-01-02 | End: 2020-01-04

## 2020-01-02 RX ORDER — OXYCODONE AND ACETAMINOPHEN 5; 325 MG/1; MG/1
1 TABLET ORAL
Status: CANCELLED | OUTPATIENT
Start: 2020-01-02

## 2020-01-02 RX ORDER — METHYLERGONOVINE MALEATE 0.2 MG/ML
200 INJECTION INTRAVENOUS
Status: CANCELLED | OUTPATIENT
Start: 2020-01-02

## 2020-01-02 RX ORDER — ONDANSETRON 2 MG/ML
4 INJECTION INTRAMUSCULAR; INTRAVENOUS EVERY 6 HOURS PRN
Status: DISCONTINUED | OUTPATIENT
Start: 2020-01-02 | End: 2020-01-04

## 2020-01-02 RX ORDER — ACETAMINOPHEN 325 MG/1
650 TABLET ORAL EVERY 4 HOURS PRN
Status: CANCELLED | OUTPATIENT
Start: 2020-01-02

## 2020-01-02 RX ORDER — NALOXONE HYDROCHLORIDE 0.4 MG/ML
.1-.4 INJECTION, SOLUTION INTRAMUSCULAR; INTRAVENOUS; SUBCUTANEOUS
Status: DISCONTINUED | OUTPATIENT
Start: 2020-01-02 | End: 2020-01-03

## 2020-01-02 RX ORDER — ACETAMINOPHEN 325 MG/1
650 TABLET ORAL EVERY 4 HOURS PRN
Status: DISCONTINUED | OUTPATIENT
Start: 2020-01-02 | End: 2020-01-04

## 2020-01-02 RX ORDER — LIDOCAINE 40 MG/G
CREAM TOPICAL
Status: CANCELLED | OUTPATIENT
Start: 2020-01-02

## 2020-01-02 RX ORDER — ONDANSETRON 2 MG/ML
4 INJECTION INTRAMUSCULAR; INTRAVENOUS EVERY 6 HOURS PRN
Status: CANCELLED | OUTPATIENT
Start: 2020-01-02

## 2020-01-02 RX ORDER — FENTANYL CITRATE 50 UG/ML
50-100 INJECTION, SOLUTION INTRAMUSCULAR; INTRAVENOUS
Status: DISCONTINUED | OUTPATIENT
Start: 2020-01-02 | End: 2020-01-04

## 2020-01-02 RX ORDER — MISOPROSTOL 100 UG/1
25 TABLET ORAL
Status: DISCONTINUED | OUTPATIENT
Start: 2020-01-02 | End: 2020-01-04

## 2020-01-02 RX ORDER — OXYTOCIN/0.9 % SODIUM CHLORIDE 30/500 ML
100-340 PLASTIC BAG, INJECTION (ML) INTRAVENOUS CONTINUOUS PRN
Status: CANCELLED | OUTPATIENT
Start: 2020-01-02

## 2020-01-02 RX ORDER — OXYTOCIN/0.9 % SODIUM CHLORIDE 30/500 ML
100-340 PLASTIC BAG, INJECTION (ML) INTRAVENOUS CONTINUOUS PRN
Status: COMPLETED | OUTPATIENT
Start: 2020-01-02 | End: 2020-01-04

## 2020-01-02 RX ADMIN — Medication 25 MCG: at 18:56

## 2020-01-02 RX ADMIN — Medication 25 MCG: at 22:08

## 2020-01-02 RX ADMIN — SODIUM CHLORIDE, POTASSIUM CHLORIDE, SODIUM LACTATE AND CALCIUM CHLORIDE 500 ML: 600; 310; 30; 20 INJECTION, SOLUTION INTRAVENOUS at 23:04

## 2020-01-02 ASSESSMENT — MIFFLIN-ST. JEOR: SCORE: 1770.13

## 2020-01-02 NOTE — NURSING NOTE
"Initial /67 (BP Location: Right arm, Patient Position: Chair, Cuff Size: Adult Large)   Pulse 102   Temp 99.3  F (37.4  C) (Tympanic)   Resp 18   Ht 1.626 m (5' 4\")   Wt 102.5 kg (226 lb)   LMP 03/21/2019   BMI 38.79 kg/m   Estimated body mass index is 38.79 kg/m  as calculated from the following:    Height as of this encounter: 1.626 m (5' 4\").    Weight as of this encounter: 102.5 kg (226 lb). .      "

## 2020-01-02 NOTE — PROGRESS NOTES
Addendum:    BPP=88    ENH=1686fe (>90%); normal PAT    I discussed the above with the pt, and feel best course of action would be cervical ripening starting this evening with Misoprostol followed by formal IOL with pitocin when appropriate.  The patient and I discussed that she may need labor induction. You may need medications (either in the vagina or by mouth) to prepare the cervix for labor. Once your cervix is ready for labor, you may need a medication called pitocin. This is a synthetic version of the chemical oxytocin that your brain naturally makes to induce labor. The goal of this medication is to cause regular, painful contractions.  I discussed with the patient the risks, benefits, and alternative approaches; and the possible need for  birth. EFW is 3998gm.  The Labor Induction: What You Need to Know information sheet was made available to her. Questions and concerns were addressed and patient agrees to above if necessary during the course of her labor.

## 2020-01-02 NOTE — PROGRESS NOTES
"CC: Here for routine prenatal visit @ 41w0d   HPI:  Pt states having only mild contractions; no LOF; normal fetal movement    PE: /67 (BP Location: Right arm, Patient Position: Chair, Cuff Size: Adult Large)   Pulse 102   Temp 99.3  F (37.4  C) (Tympanic)   Resp 18   Ht 1.626 m (5' 4\")   Wt 102.5 kg (226 lb)   LMP 03/21/2019   BMI 38.79 kg/m     See OB flowsheet  Fetal head ballotable on exam  Leopold's suggest baby is LGA, 9+ lbs.    A:  41 weeks gestation  Large for gestational age    I discussed with pt that I have concern about postdates along with large fetal size by my clinical exam. I recommend we obtain an ultrasound today for EFW and BPP, then determine best course of action.  Margarita Morillo M.D.     "

## 2020-01-03 ENCOUNTER — ANESTHESIA EVENT (OUTPATIENT)
Dept: OBGYN | Facility: CLINIC | Age: 25
End: 2020-01-03
Payer: COMMERCIAL

## 2020-01-03 ENCOUNTER — ANESTHESIA (OUTPATIENT)
Dept: OBGYN | Facility: CLINIC | Age: 25
End: 2020-01-03
Payer: COMMERCIAL

## 2020-01-03 LAB — T PALLIDUM AB SER QL: NONREACTIVE

## 2020-01-03 PROCEDURE — 00HU33Z INSERTION OF INFUSION DEVICE INTO SPINAL CANAL, PERCUTANEOUS APPROACH: ICD-10-PCS | Performed by: OBSTETRICS & GYNECOLOGY

## 2020-01-03 PROCEDURE — 25800030 ZZH RX IP 258 OP 636: Performed by: NURSE ANESTHETIST, CERTIFIED REGISTERED

## 2020-01-03 PROCEDURE — 25800030 ZZH RX IP 258 OP 636: Performed by: OBSTETRICS & GYNECOLOGY

## 2020-01-03 PROCEDURE — 3E0R3BZ INTRODUCTION OF ANESTHETIC AGENT INTO SPINAL CANAL, PERCUTANEOUS APPROACH: ICD-10-PCS | Performed by: OBSTETRICS & GYNECOLOGY

## 2020-01-03 PROCEDURE — 25000128 H RX IP 250 OP 636: Performed by: NURSE ANESTHETIST, CERTIFIED REGISTERED

## 2020-01-03 PROCEDURE — 25000125 ZZHC RX 250: Performed by: NURSE ANESTHETIST, CERTIFIED REGISTERED

## 2020-01-03 PROCEDURE — 25000125 ZZHC RX 250: Performed by: OBSTETRICS & GYNECOLOGY

## 2020-01-03 PROCEDURE — 40000671 ZZH STATISTIC ANESTHESIA CASE

## 2020-01-03 PROCEDURE — 25000132 ZZH RX MED GY IP 250 OP 250 PS 637: Performed by: OBSTETRICS & GYNECOLOGY

## 2020-01-03 PROCEDURE — 12000000 ZZH R&B MED SURG/OB

## 2020-01-03 PROCEDURE — 37000011 ZZH ANESTHESIA WARD SERVICE: Performed by: NURSE ANESTHETIST, CERTIFIED REGISTERED

## 2020-01-03 RX ORDER — BUPIVACAINE HYDROCHLORIDE 2.5 MG/ML
INJECTION, SOLUTION EPIDURAL; INFILTRATION; INTRACAUDAL PRN
Status: DISCONTINUED | OUTPATIENT
Start: 2020-01-03 | End: 2020-01-04

## 2020-01-03 RX ORDER — LIDOCAINE HYDROCHLORIDE AND EPINEPHRINE 15; 5 MG/ML; UG/ML
INJECTION, SOLUTION EPIDURAL PRN
Status: DISCONTINUED | OUTPATIENT
Start: 2020-01-03 | End: 2020-01-04

## 2020-01-03 RX ORDER — FENTANYL CITRATE 50 UG/ML
INJECTION, SOLUTION INTRAMUSCULAR; INTRAVENOUS
Status: COMPLETED
Start: 2020-01-03 | End: 2020-01-03

## 2020-01-03 RX ORDER — LIDOCAINE HYDROCHLORIDE 10 MG/ML
INJECTION, SOLUTION INFILTRATION; PERINEURAL PRN
Status: DISCONTINUED | OUTPATIENT
Start: 2020-01-03 | End: 2020-01-04

## 2020-01-03 RX ORDER — NALBUPHINE HYDROCHLORIDE 10 MG/ML
2.5-5 INJECTION, SOLUTION INTRAMUSCULAR; INTRAVENOUS; SUBCUTANEOUS EVERY 6 HOURS PRN
Status: DISCONTINUED | OUTPATIENT
Start: 2020-01-03 | End: 2020-01-04

## 2020-01-03 RX ORDER — NALOXONE HYDROCHLORIDE 0.4 MG/ML
.1-.4 INJECTION, SOLUTION INTRAMUSCULAR; INTRAVENOUS; SUBCUTANEOUS
Status: DISCONTINUED | OUTPATIENT
Start: 2020-01-03 | End: 2020-01-04

## 2020-01-03 RX ORDER — EPHEDRINE SULFATE 50 MG/ML
5 INJECTION, SOLUTION INTRAMUSCULAR; INTRAVENOUS; SUBCUTANEOUS
Status: DISCONTINUED | OUTPATIENT
Start: 2020-01-03 | End: 2020-01-04

## 2020-01-03 RX ORDER — FENTANYL CITRATE 50 UG/ML
INJECTION, SOLUTION INTRAMUSCULAR; INTRAVENOUS PRN
Status: DISCONTINUED | OUTPATIENT
Start: 2020-01-03 | End: 2020-01-04

## 2020-01-03 RX ORDER — BUPIVACAINE HYDROCHLORIDE 2.5 MG/ML
INJECTION, SOLUTION EPIDURAL; INFILTRATION; INTRACAUDAL
Status: COMPLETED
Start: 2020-01-03 | End: 2020-01-03

## 2020-01-03 RX ADMIN — SODIUM CHLORIDE, POTASSIUM CHLORIDE, SODIUM LACTATE AND CALCIUM CHLORIDE: 600; 310; 30; 20 INJECTION, SOLUTION INTRAVENOUS at 15:31

## 2020-01-03 RX ADMIN — Medication 25 MCG: at 02:31

## 2020-01-03 RX ADMIN — SODIUM CHLORIDE, POTASSIUM CHLORIDE, SODIUM LACTATE AND CALCIUM CHLORIDE: 600; 310; 30; 20 INJECTION, SOLUTION INTRAVENOUS at 21:47

## 2020-01-03 RX ADMIN — Medication 2 MILLI-UNITS/MIN: at 08:50

## 2020-01-03 RX ADMIN — Medication 8 ML: at 15:43

## 2020-01-03 RX ADMIN — LIDOCAINE HYDROCHLORIDE AND EPINEPHRINE 30 MG: 15; 5 INJECTION, SOLUTION EPIDURAL at 15:42

## 2020-01-03 RX ADMIN — BUPIVACAINE HYDROCHLORIDE 5 ML: 2.5 INJECTION, SOLUTION EPIDURAL; INFILTRATION; INTRACAUDAL at 22:23

## 2020-01-03 RX ADMIN — FENTANYL CITRATE 100 MCG: 50 INJECTION, SOLUTION INTRAMUSCULAR; INTRAVENOUS at 22:18

## 2020-01-03 RX ADMIN — LIDOCAINE HYDROCHLORIDE 100 MG: 10 INJECTION, SOLUTION INFILTRATION; PERINEURAL at 15:19

## 2020-01-03 RX ADMIN — BUPIVACAINE HYDROCHLORIDE: 7.5 INJECTION, SOLUTION EPIDURAL; RETROBULBAR at 21:56

## 2020-01-03 RX ADMIN — BUPIVACAINE HYDROCHLORIDE 5 ML: 2.5 INJECTION, SOLUTION EPIDURAL; INFILTRATION; INTRACAUDAL at 22:18

## 2020-01-03 RX ADMIN — LIDOCAINE HYDROCHLORIDE AND EPINEPHRINE 45 MG: 15; 5 INJECTION, SOLUTION EPIDURAL at 15:41

## 2020-01-03 RX ADMIN — SODIUM CHLORIDE, POTASSIUM CHLORIDE, SODIUM LACTATE AND CALCIUM CHLORIDE: 600; 310; 30; 20 INJECTION, SOLUTION INTRAVENOUS at 08:46

## 2020-01-03 RX ADMIN — BUPIVACAINE HYDROCHLORIDE 10 ML/HR: 7.5 INJECTION, SOLUTION EPIDURAL; RETROBULBAR at 15:45

## 2020-01-03 RX ADMIN — Medication 2 MILLI-UNITS/MIN: at 17:13

## 2020-01-03 RX ADMIN — SODIUM CHLORIDE, POTASSIUM CHLORIDE, SODIUM LACTATE AND CALCIUM CHLORIDE 1000 ML: 600; 310; 30; 20 INJECTION, SOLUTION INTRAVENOUS at 14:16

## 2020-01-03 NOTE — ANESTHESIA PROCEDURE NOTES
Peripheral nerve/Neuraxial procedure note : epidural catheter  Pre-Procedure  Performed by  Jenny Street APRN CRNA   Location: OB    Procedure Times:1/3/2020 3:16 PM and 1/3/2020 3:48 PM  Pre-Anesthestic Checklist: patient identified, IV checked, risks and benefits discussed, informed consent, monitors and equipment checked, pre-op evaluation and at physician/surgeon's request    Timeout  Correct Patient: Yes   Correct Procedure: Yes   Correct Site: Yes   Correct Laterality: N/A   Correct Position: Yes   Site Marked: N/A   .   Procedure Documentation    Diagnosis:pain.    Procedure: epidural catheter, .   Patient Position:sitting Insertion Site:L4-5  (midline approach) Injection technique: LORT saline   Local skin infiltrated with 10 mL of 1% lidocaine.  SRIKANTH at 8 cm    Patient Prep/Sterile Barriers; mask, sterile gloves, patient draped.  .  Needle: Touhy needle   Needle Gauge: 17.    Needle Length (Inches) 3.5   # of attempts: 2 and  # of redirects:  2 .    Catheter: 19 G . .  Catheter threaded easily  .  14 cm at skin.   .    Assessment/Narrative  Paresthesias: No.  .  .  Aspiration negative for heme or CSF  . Test dose of 5 mL lidocaine 1.5% w/ 1:200,000 epinephrine at 15:41.  Test dose negative for signs of intravascular, subdural or intrathecal injection. Comments:  VAS pain score prior to epidural:6/10    VAS pain score after epidural:2/10    Pt. Tolerated well, FHR stable.

## 2020-01-03 NOTE — PROGRESS NOTES
IV Oxytocin per OB protocol beginning at 2 mU/min.  Pt rates her pain at a 0/10. Understands that she will be on continuous EFM throughout induction. FHR baseline is 135 with mod variability. Patient agrees with plan of care. Consent signed. Will observe for active phase of labor and fetal tolerance.

## 2020-01-03 NOTE — ANESTHESIA PREPROCEDURE EVALUATION
Anesthesia Pre-Procedure Evaluation    Patient: Chani Martins   MRN: [id number] : 1995          Preoperative Diagnosis: * No surgery found *        Past Medical History:   Diagnosis Date     Anemia      Anxiety      Depression      Past Surgical History:   Procedure Laterality Date     BUNIONECTOMY MACKNEZIE  2011    Procedure:BUNIONECTOMY MACKENZIE; Subtalar Implant; Surgeon:HELGA URENA; Location:[place] OR     LENGTHEN TENDON ACHILLES  2011    Procedure:LENGTHEN TENDON ACHILLES; Surgeon:HELGA URENA; Location:[place] OR       Anesthesia Evaluation       history and physical reviewed .             ROS/MED HX    ENT/Pulmonary:  - neg pulmonary ROS     Neurologic:  - neg neurologic ROS     Cardiovascular:  - neg cardiovascular ROS       METS/Exercise Tolerance:     Hematologic:         Musculoskeletal:         GI/Hepatic:  - neg GI/hepatic ROS       Renal/Genitourinary:         Endo:         Psychiatric:         Infectious Disease:         Malignancy:         Other:                     neg OB ROS            Physical Exam  Normal systems: cardiovascular, pulmonary and dental    Airway   Mallampati: II  TM distance: > 3 FB  Neck ROM: full  Mouth opening: > 3 cm    Dental     Cardiovascular       Pulmonary             Lab Results   Component Value Date    WBC 16.6 (H) 2020    HGB 10.6 (L) 2020    HCT 32.9 (L) 2020     2020     2019    POTASSIUM 3.6 2019    CHLORIDE 109 2019    CO2 22 2019    BUN 9 2019    CR 0.54 2019    GLC 69 (L) 2019    MOI 8.9 2019    ALBUMIN 3.3 (L) 2019    PROTTOTAL 7.4 2019    ALT 16 2019    AST 18 2019    ALKPHOS 53 2019    BILITOTAL 0.4 2019    PTT 34 2012    INR 1.10 2012    TSH 3.58 2017    T4 0.83 2013    T3 147 2013    HCG Negative 2013       Preop Vitals  BP Readings from Last 3 Encounters:   20  "139/63   01/02/20 128/67   12/16/19 133/73    Pulse Readings from Last 3 Encounters:   01/03/20 88   01/02/20 102   12/16/19 94      Resp Readings from Last 3 Encounters:   01/03/20 16   01/02/20 18   12/16/19 18    SpO2 Readings from Last 3 Encounters:   01/03/20 100%   07/23/19 100%   05/10/18 99%      Temp Readings from Last 1 Encounters:   01/03/20 36.7  C (98.1  F) (Oral)    Ht Readings from Last 1 Encounters:   01/02/20 1.626 m (5' 4\")      Wt Readings from Last 1 Encounters:   01/02/20 102.5 kg (226 lb)    Estimated body mass index is 38.79 kg/m  as calculated from the following:    Height as of an earlier encounter on 1/2/20: 1.626 m (5' 4\").    Weight as of an earlier encounter on 1/2/20: 102.5 kg (226 lb).       Anesthesia Plan      History & Physical Review      ASA Status:  .  OB Epidural Asa: 2            Postoperative Care      Consents  Anesthetic plan, risks, benefits and alternatives discussed with:  Patient..                 Jenny Street, CRNA, APRN CRNA  "

## 2020-01-03 NOTE — PLAN OF CARE
Patient and S.O. to [facility] for scheduled IOL, starting with Cervical ripening.  Verified that the provider has discussed with Chani Martins, the following; indications; the agents and methods of labor induction or augmentation, including risks, benefits, and alternative approached; and the possible need for repeat induction or  birth. Questions and concerns were addressed and patient agrees to above. Instructed regarding continuous fetal monitoring and BRP's prn if FHR/uterine activity stable. [id number], cat 1., NST reactive. Mild uterine contractions every 3-4 minutes, patient not aware of most of them, denies pain.  SVE 1 cm/ 60%/ -2.  Cytotec 25 mcg given orally at 1900. Report given to Sally BOYD.

## 2020-01-03 NOTE — PROGRESS NOTES
Pt up to birthing ball.  FHT's dropped to 90's-100's.  Pt back to bed.  Pitocin turned off.  Pt positioned on Left lateral side.    FHT's resolved & back into 130's with mod luann & excels x 35 minutes.    Updated Dr Morillo on pt's status.  Ok to restart pitocin per protocol.

## 2020-01-03 NOTE — PROGRESS NOTES
Verified that the provider has discussed with Chani Martins, the following; indications; the agents and methods of labor induction or augmentation, including risks, benefits, and alternative approached; and the possible need for repeat induction or  birth. Questions and concerns were addressed and patient agrees to above. Instructed regarding continuous fetal monitoring and BRP's prn if FHR/uterine activity stable.

## 2020-01-03 NOTE — H&P
[facility] Labor and Delivery H&P  2020  Chani Martins  [id number]      HPI: Chani Martins is a 24 year old  at 41w0d who presents for IOL for late term gestation. Occasional ctx. No LOF, VB. +FM. Otherwise feeling well. She denies fever, HA, scotoma, nausea, vomiting, CP, SOB, RUQ pain, constipation, diarrhea, and acute swelling.        OBHX:   OB History    Para Term  AB Living   1 0 0 0 0 0   SAB TAB Ectopic Multiple Live Births   0 0 0 0 0      # Outcome Date GA Lbr Todd/2nd Weight Sex Delivery Anes PTL Lv   1 Current              MedicalHX:   Past Medical History:   Diagnosis Date     Anemia      Anxiety      Depression      SurgicalHX:   Past Surgical History:   Procedure Laterality Date     BUNIONECTOMY MACKENZIE  2011    Procedure:BUNIONECTOMY MACKENZIE; Subtalar Implant; Surgeon:HELGA URENA; Location:[place]     LENGTHEN TENDON ACHILLES  2011    Procedure:LENGTHEN TENDON ACHILLES; Surgeon:HELGA URENA; Location:[place]     Medications:   No current facility-administered medications on file prior to encounter.   acyclovir (ZOVIRAX) 5 % external ointment, Apply topically 6 times daily  Prenatal Vit-Fe Fumarate-FA (PRENATAL MULTIVITAMIN W/IRON) 27-0.8 MG tablet, Take 1 tablet by mouth every evening   Misc. Devices (BREAST PUMP) MISC, 1 each daily      Allergies:  No Known Allergies    FamilyHX:  Family History   Problem Relation Age of Onset     Other - See Comments Paternal Grandfather               Anemia Mother         with pregnancy     Unknown/Adopted Father         father adopted     Depression Father      Alcoholism Father      Breast Cancer Maternal Grandmother      Diabetes Maternal Grandmother      Depression Maternal Grandmother      Depression Maternal Grandfather         suicide     SocialHX: Neg x3     ROS: 10-point ROS negative except as in HPI     Physical Exam:  Vitals:    20 1820   BP: 133/61   Pulse:  88   Resp: 16   Temp: 97.8  F (36.6  C)   TempSrc: Oral   GEN: resting comfortably in bed, NAD   CV: RRR, no murmurs  PULM: CTAB, no increased work of breathing, no cough/wheeze   ABD: soft, gravid, non-tender, non-distended  EXT: trace edema, non tender to palpation  CVX: 1 cm per RN    NST:  FHT: baseline 145, moderate variability, + accels, occ variable decels  TOCO: 2-3 ctx/10    Labs:   T&S, CBC, RPR     Lab Results   Component Value Date    ABO O 2020    RH Pos 2020    AS Neg 2020    HEPBANG Nonreactive 2019    CHPCRT Negative 2019    GCPCRT Negative 2019    HGB 10.6 (L) 2020     GBS Status: neg  Pap NILM     A/P: Chani Martins is a 24 year old female  at 41w0d who presents for IOL.    Admit to L&D. Place PIV. Draw labs: T&S, CBC, RPR   Labor: Anticipate   FWB: Category 2 FHT, overall reassuring.  Continue EFM and toco  Pain: Understands options  PNC: Rh +, Rubella immune, GBS neg,     Scarlet Gillis MD   OB/GYN   2020 9:51 PM

## 2020-01-03 NOTE — PLAN OF CARE
Pt FHT category 2 overnight, spontaneous variables, musa consistently overnight, averaging 2-4 mins, at times patient was feeling crampy but she was able to sleep through them for the most part.  Her contractions woke her at 0530, she noticed some mucous discharge but her membranes remain intact. Family present & supportive, MD updated this morning & report given to oncoming RN.

## 2020-01-03 NOTE — PROGRESS NOTES
Assuming care for pt admitted yesterday PM for cervical ripening with Misoprostol then IOL for postdates pregnancy; her FHT has been category 2 overnight, variables; the tracing is now category 1    /76   Pulse 88   Temp 97.9  F (36.6  C) (Oral)   Resp 16   LMP 03/21/2019   Alert oriented  EFW 3998gm    Cervix: 1+/80/-2  Soft, mid position    A: 41 w 1d  IUP    P: start pitocin IOL, continue monitoring  Margarita Morillo MD  [facility]

## 2020-01-04 PROBLEM — Z3A.41 41 WEEKS GESTATION OF PREGNANCY: Status: ACTIVE | Noted: 2020-01-04

## 2020-01-04 PROBLEM — O48.0 41 WEEKS GESTATION OF PREGNANCY: Status: ACTIVE | Noted: 2020-01-04

## 2020-01-04 PROCEDURE — 0KQM0ZZ REPAIR PERINEUM MUSCLE, OPEN APPROACH: ICD-10-PCS | Performed by: OBSTETRICS & GYNECOLOGY

## 2020-01-04 PROCEDURE — 3E0P7VZ INTRODUCTION OF HORMONE INTO FEMALE REPRODUCTIVE, VIA NATURAL OR ARTIFICIAL OPENING: ICD-10-PCS | Performed by: OBSTETRICS & GYNECOLOGY

## 2020-01-04 PROCEDURE — 25000125 ZZHC RX 250: Performed by: NURSE ANESTHETIST, CERTIFIED REGISTERED

## 2020-01-04 PROCEDURE — 3E033VJ INTRODUCTION OF OTHER HORMONE INTO PERIPHERAL VEIN, PERCUTANEOUS APPROACH: ICD-10-PCS | Performed by: OBSTETRICS & GYNECOLOGY

## 2020-01-04 PROCEDURE — 12000000 ZZH R&B MED SURG/OB

## 2020-01-04 PROCEDURE — 72200001 ZZH LABOR CARE VAGINAL DELIVERY SINGLE

## 2020-01-04 PROCEDURE — 25000125 ZZHC RX 250: Performed by: OBSTETRICS & GYNECOLOGY

## 2020-01-04 PROCEDURE — 25000132 ZZH RX MED GY IP 250 OP 250 PS 637: Performed by: OBSTETRICS & GYNECOLOGY

## 2020-01-04 PROCEDURE — 10907ZC DRAINAGE OF AMNIOTIC FLUID, THERAPEUTIC FROM PRODUCTS OF CONCEPTION, VIA NATURAL OR ARTIFICIAL OPENING: ICD-10-PCS | Performed by: OBSTETRICS & GYNECOLOGY

## 2020-01-04 PROCEDURE — 59400 OBSTETRICAL CARE: CPT | Performed by: OBSTETRICS & GYNECOLOGY

## 2020-01-04 RX ORDER — OXYTOCIN 10 [USP'U]/ML
10 INJECTION, SOLUTION INTRAMUSCULAR; INTRAVENOUS
Status: DISCONTINUED | OUTPATIENT
Start: 2020-01-04 | End: 2020-01-06 | Stop reason: HOSPADM

## 2020-01-04 RX ORDER — METHYLERGONOVINE MALEATE 0.2 MG/ML
200 INJECTION INTRAVENOUS
Status: DISCONTINUED | OUTPATIENT
Start: 2020-01-04 | End: 2020-01-06 | Stop reason: HOSPADM

## 2020-01-04 RX ORDER — NALOXONE HYDROCHLORIDE 0.4 MG/ML
.1-.4 INJECTION, SOLUTION INTRAMUSCULAR; INTRAVENOUS; SUBCUTANEOUS
Status: DISCONTINUED | OUTPATIENT
Start: 2020-01-04 | End: 2020-01-06 | Stop reason: HOSPADM

## 2020-01-04 RX ORDER — IBUPROFEN 800 MG/1
800 TABLET, FILM COATED ORAL EVERY 6 HOURS PRN
Status: DISCONTINUED | OUTPATIENT
Start: 2020-01-04 | End: 2020-01-06 | Stop reason: HOSPADM

## 2020-01-04 RX ORDER — OXYCODONE HYDROCHLORIDE 5 MG/1
5 TABLET ORAL EVERY 4 HOURS PRN
Status: DISCONTINUED | OUTPATIENT
Start: 2020-01-04 | End: 2020-01-06 | Stop reason: HOSPADM

## 2020-01-04 RX ORDER — CARBOPROST TROMETHAMINE 250 UG/ML
250 INJECTION, SOLUTION INTRAMUSCULAR
Status: DISCONTINUED | OUTPATIENT
Start: 2020-01-04 | End: 2020-01-06 | Stop reason: HOSPADM

## 2020-01-04 RX ORDER — ACETAMINOPHEN 325 MG/1
650 TABLET ORAL EVERY 4 HOURS PRN
Status: DISCONTINUED | OUTPATIENT
Start: 2020-01-04 | End: 2020-01-06 | Stop reason: HOSPADM

## 2020-01-04 RX ORDER — LANOLIN 100 %
OINTMENT (GRAM) TOPICAL
Status: DISCONTINUED | OUTPATIENT
Start: 2020-01-04 | End: 2020-01-06 | Stop reason: HOSPADM

## 2020-01-04 RX ORDER — AMOXICILLIN 250 MG
2 CAPSULE ORAL 2 TIMES DAILY
Status: DISCONTINUED | OUTPATIENT
Start: 2020-01-04 | End: 2020-01-06 | Stop reason: HOSPADM

## 2020-01-04 RX ORDER — OXYTOCIN/0.9 % SODIUM CHLORIDE 30/500 ML
100 PLASTIC BAG, INJECTION (ML) INTRAVENOUS CONTINUOUS
Status: DISCONTINUED | OUTPATIENT
Start: 2020-01-04 | End: 2020-01-06 | Stop reason: HOSPADM

## 2020-01-04 RX ORDER — OXYTOCIN/0.9 % SODIUM CHLORIDE 30/500 ML
340 PLASTIC BAG, INJECTION (ML) INTRAVENOUS CONTINUOUS PRN
Status: DISCONTINUED | OUTPATIENT
Start: 2020-01-04 | End: 2020-01-06 | Stop reason: HOSPADM

## 2020-01-04 RX ORDER — MISOPROSTOL 200 UG/1
TABLET ORAL
Status: DISCONTINUED
Start: 2020-01-04 | End: 2020-01-04 | Stop reason: HOSPADM

## 2020-01-04 RX ORDER — BISACODYL 10 MG
10 SUPPOSITORY, RECTAL RECTAL DAILY PRN
Status: DISCONTINUED | OUTPATIENT
Start: 2020-01-06 | End: 2020-01-06 | Stop reason: HOSPADM

## 2020-01-04 RX ORDER — LIDOCAINE HCL/EPINEPHRINE/PF 2%-1:200K
VIAL (ML) INJECTION PRN
Status: DISCONTINUED | OUTPATIENT
Start: 2020-01-04 | End: 2020-01-04

## 2020-01-04 RX ORDER — MISOPROSTOL 200 UG/1
800 TABLET ORAL
Status: COMPLETED | OUTPATIENT
Start: 2020-01-04 | End: 2020-01-04

## 2020-01-04 RX ORDER — HYDROCORTISONE 2.5 %
CREAM (GRAM) TOPICAL 3 TIMES DAILY PRN
Status: DISCONTINUED | OUTPATIENT
Start: 2020-01-04 | End: 2020-01-06 | Stop reason: HOSPADM

## 2020-01-04 RX ORDER — AMOXICILLIN 250 MG
1 CAPSULE ORAL 2 TIMES DAILY
Status: DISCONTINUED | OUTPATIENT
Start: 2020-01-04 | End: 2020-01-06 | Stop reason: HOSPADM

## 2020-01-04 RX ORDER — LIDOCAINE HYDROCHLORIDE 10 MG/ML
INJECTION, SOLUTION EPIDURAL; INFILTRATION; INTRACAUDAL; PERINEURAL
Status: DISPENSED
Start: 2020-01-04 | End: 2020-01-04

## 2020-01-04 RX ADMIN — LIDOCAINE HYDROCHLORIDE,EPINEPHRINE BITARTRATE 5 ML: 20; .005 INJECTION, SOLUTION EPIDURAL; INFILTRATION; INTRACAUDAL; PERINEURAL at 00:30

## 2020-01-04 RX ADMIN — ACETAMINOPHEN 650 MG: 325 TABLET, FILM COATED ORAL at 23:17

## 2020-01-04 RX ADMIN — MISOPROSTOL 800 MCG: 200 TABLET ORAL at 04:23

## 2020-01-04 RX ADMIN — Medication 170 ML/HR: at 03:00

## 2020-01-04 RX ADMIN — IBUPROFEN 800 MG: 800 TABLET ORAL at 16:45

## 2020-01-04 RX ADMIN — ACETAMINOPHEN 650 MG: 325 TABLET, FILM COATED ORAL at 06:10

## 2020-01-04 RX ADMIN — SENNOSIDES AND DOCUSATE SODIUM 1 TABLET: 8.6; 5 TABLET ORAL at 08:18

## 2020-01-04 RX ADMIN — IBUPROFEN 800 MG: 800 TABLET ORAL at 08:58

## 2020-01-04 RX ADMIN — LIDOCAINE HYDROCHLORIDE,EPINEPHRINE BITARTRATE 5 ML: 20; .005 INJECTION, SOLUTION EPIDURAL; INFILTRATION; INTRACAUDAL; PERINEURAL at 00:36

## 2020-01-04 RX ADMIN — SENNOSIDES AND DOCUSATE SODIUM 1 TABLET: 8.6; 5 TABLET ORAL at 23:17

## 2020-01-04 NOTE — PROGRESS NOTES
Report received from Olive OWENS RN.   Pt denies pain but reports feeling pressure in bottom.    Pt lying on left side with peanut ball in place.  Most recent VS: BP (!) 142/78   Pulse 93   Temp 98.5  F (36.9  C) (Oral)   Resp 16   LMP 03/21/2019   SpO2 91%   Lungs CTA throughout    MD to bedside for SVE at 1926: 5/90/-1  Pitocin continues infusing at 4mu/min with ctx every 1.5-3 minutes.    ; +accels    Discussed plan of care with pt and she agrees with plan.  Plan to rest semifowlers with peanut ball at this time and turn again at 2000.     Will continue to monitor closely.

## 2020-01-04 NOTE — PROGRESS NOTES
2224: Pt reports pain has decreased and contractions are tolerable.  Pt talking through ctx.     Pt turned to left side with peanut ball in place.  Plan to rest now then turn to right side in 30min.  Pt agrees with plan.

## 2020-01-04 NOTE — PROGRESS NOTES
Pt vss, afebrile.  Small amt vaginal bleeding.  Indep with self & infant cares.  Taking Ibup & tylenol for discomfort.  Pt stable.

## 2020-01-04 NOTE — L&D DELIVERY NOTE
"Delivery Summary    Chani Martins MRN# [id number]   Age: 24 year old YOB: 1995     ASSESSMENT & PLAN: 23 y/o  admitted at 41 weeks gestation for cervical ripening and delivery; she received several doses of Misoprostol with contractions, then pitocin and ultimately, AROM to effect labor; epidural was effective for labor analgesia; she had normal stage 1 progress; labored down briefly then pushed effectively <1 hr for delivery of liveborn male over 2nd degree perineal laceration.  Delayed cord clamping was performed.  Placenta spontaneous.  BW 8 lb 15 oz.  Uterus briefly atonic, responding to massage and CO Misoprostol   ml  \"Alejo\"  Margarita Morillo MD  [facility]         Labor Event Times    Labor onset date:  1/3/20 Onset time:   2:00 PM   Dilation complete date:  20 Complete time:   1:40 AM   Start pushing date/time:  2020 0149      Labor Length    1st Stage (hrs):  11 (min):  40   2nd Stage (hrs):  1 (min):  19   3rd Stage (hrs):  0 (min):  3      Labor Events     labor?:  No  Labor Type:  Induction  Predominate monitoring during 1st stage:  continuous electronic fetal monitoring     Rupture date/time: 1/3/20 1203   Rupture type:  Artificial Rupture of Membranes  Fluid color:  Clear  Fluid odor:  Normal     Induction date/time:     Cervical ripening date/time:     Indications for induction:  Post-term Gestation     1:1 continuous labor support provided by?:  RN       Delivery/Placenta Date and Time    Delivery Date:  20 Delivery Time:   2:59 AM   Placenta Date/Time:  2020  3:02 AM  Oxytocin given at the time of delivery:  after delivery of baby     Vaginal Counts     Initial count performed by 2 team members:   Two Team Members   jose carlos bach       Needles Suture [place] Sponges Instruments   Initial counts 2 1 5    Added to count       Final counts       Placed during labor Accounted for at the end of labor    Final count performed by 2 team " "members:   Two Team Members   COREY SMART             Apgars    Living status:  Living   1 Minute 5 Minute 10 Minute 15 Minute 20 Minute   Skin color: 0  1       Heart rate: 2  2       Reflex irritability: 2  2       Muscle tone: 2  2       Respiratory effort: 2  2       Total: 8  9       Apgars assigned by:  ADAM FORREST     Cord     Complications:  None   Cord Blood Disposition:  Lab       [place] Resuscitation    Methods:  None          [place] Measurements    Weight:  8 lb 14.9 oz Length:  1' 9.7\"   Head circumference:  35.6 cm       Skin to Skin and Feeding Plan    Skin to skin initiation date/time: 1841    Skin to skin with:  Mother  Skin to skin end date/time:     How do you plan to feed your baby:  Breastfeeding     Labor Events and Shoulder Dystocia    Fetal Tracing Prior to Delivery:  Category 2  Shoulder dystocia present?:  Neg             Delivery (Maternal) (Provider to Complete) ([id number])    Episiotomy:  None  Perineal lacerations:  2nd Repaired?:  Yes      Blood Loss  Mother: [place] Martins Chani ARTURO #[id number]   Start of Mother's Information    IO Blood Loss  20 1400 - 20 0259    Postpartum QBL (mL) Hospital Encounter 115 mL    Total  115 mL         End of Mother's Information  Mother: [place] Martins Chani MORRIS #[id number]         Delivery - Provider to Complete ([id number])    Delivering clinician:  Margarita Morillo MD  Attempted Delivery Types (Choose all that apply):  Spontaneous Vaginal Delivery  Delivery Type (Choose the 1 that will go to the Birth History):  Vaginal, Spontaneous   Other personnel:   Provider Role   Margarita Morillo MD Obstetrician   Adam Forrest RN Delivery Nurse   Mel Smart RN Charge Nurse   Dalia Anderson RN Delivery Assist   Meghna Castaneda APRN CNP Nurse Practitioner         Placenta    Delayed Cord Clamping:  Done  Date/Time:  2020  3:02 AM  Removal:  Spontaneous  Disposition:  Hospital disposal   "   Anesthesia    Method:  Epidural          Presentation and Position    Presentation:  Vertex  Position:  Left Occiput Anterior           Margarita Morillo MD

## 2020-01-04 NOTE — PROGRESS NOTES
Pt was reporting pain 4/10 and intolerable - feeling pain and pressure in bottom.  Pt has pushed epidural button but not feeling relief.     Brooke RODRIGUEZ CRNA called for epidural bolus.

## 2020-01-04 NOTE — PROGRESS NOTES
0600: Pt up to void via Avinash Steady - voids without difficulty.  Transferred to room 2042 via avinash steady with infant in bassinet.    FF, midline, U/U; light lochia flow with no clots.  Total   Perineum swollen with mild bruising - ice pack, tucks, dermoplast used for comfort.  Pt reporting pain in perineum - PRN tylenol administered per pt request.    Pt oriented to room and plan of care.  Reminded pt to call for help before getting up to use bathroom again - pt agreed.   Will continue to monitor.

## 2020-01-04 NOTE — PLAN OF CARE
S:Delivery  B:Induced  Labor,41w2d    Lab Results   Component Value Date    GBS Negative 2019    with antibiotic treatment not indicated 4 hours prior to delivery.  A: Patient delivered   lac 2nd degree at 0259 with Dr. JESSICA Morillo in attendance and baby placed on mother's abdomen for delayed cord clamping. Baby dried and stimulated. Baby placed  skin to skin @ 0300.. Apgars 8/9.  IV infusion of Oxytocin  infused. Placenta removal spontaneous. MD does not want placenta sent to pathology.   ml and documented on flowsheet. See Flowsheet for VS and PP checks.  .  Labor care plan goals met, transition now to postpartum care.  R: Expect routine postpartum care. Anticipate first feeding within the hour or whenever infant displays feeding cues. Continue skin to skin. Prior discussion with mother indicates that feeding plan is Breast feeding. Educated mother on importance of exclusive breastfeeding, expected feeding readiness cues and encouraged her to observe for these cues while rooming in. Informed her that breastfeeding assistance would be provided.

## 2020-01-04 NOTE — PROGRESS NOTES
SVE 9-10/100/0 Pt feeling pressure and pain in perineum and rating pain 6/10.  Pt requesting another epidural bolus.  Brooke RODRIGUEZ, CRNA paged.

## 2020-01-05 LAB — HGB BLD-MCNC: 7.8 G/DL (ref 11.7–15.7)

## 2020-01-05 PROCEDURE — 25000132 ZZH RX MED GY IP 250 OP 250 PS 637: Performed by: OBSTETRICS & GYNECOLOGY

## 2020-01-05 PROCEDURE — 12000000 ZZH R&B MED SURG/OB

## 2020-01-05 PROCEDURE — 36415 COLL VENOUS BLD VENIPUNCTURE: CPT | Performed by: OBSTETRICS & GYNECOLOGY

## 2020-01-05 PROCEDURE — 85018 HEMOGLOBIN: CPT | Performed by: OBSTETRICS & GYNECOLOGY

## 2020-01-05 RX ADMIN — ACETAMINOPHEN 650 MG: 325 TABLET, FILM COATED ORAL at 19:45

## 2020-01-05 RX ADMIN — ACETAMINOPHEN 650 MG: 325 TABLET, FILM COATED ORAL at 15:05

## 2020-01-05 RX ADMIN — ACETAMINOPHEN 650 MG: 325 TABLET, FILM COATED ORAL at 04:50

## 2020-01-05 RX ADMIN — ACETAMINOPHEN 650 MG: 325 TABLET, FILM COATED ORAL at 09:03

## 2020-01-05 NOTE — PROGRESS NOTES
[facility] Obstetrics Post-Partum Progress Note          Assessment and Plan:    Assessment:   Post-partum day #1  Normal spontaneous vaginal delivery  L&D complications: Intrauterine pregnancy at 41+2 weeks gestation      Doing well.  No excessive bleeding  Pain well-controlled.      Plan:   Ambulation encouraged  Breast feeding strategies discussed  Anticipate discharge tomorrow           Interval History:   Doing well.  Pain is well-controlled.  No fevers.  No history of foul-smelling vaginal discharge.  Good appetite.  Denies chest pain, shortness of breath, nausea or vomiting.  Vaginal bleeding is similar to a heavy menstrual flow.  Ambulatory.  Breastfeeding well.          Significant Problems:    None          Review of Systems:    The patient denies any chest pain, shortness of breath, excessive pain, fever, chills, purulent drainage from the wound, nausea or vomiting.          Medications:   All medications related to the patient's surgery have been reviewed          Physical Exam:   All vitals stable  Uterine fundus is firm, non-tender and at the level of the umbilicus          Data:     All laboratory data related to this surgery reviewed  Hemoglobin   Date Value Ref Range Status   01/05/2020 7.8 (L) 11.7 - 15.7 g/dL Final   01/02/2020 10.6 (L) 11.7 - 15.7 g/dL Final   09/11/2019 11.5 (L) 11.7 - 15.7 g/dL Final   07/23/2019 12.5 11.7 - 15.7 g/dL Final   05/13/2019 11.5 (L) 11.7 - 15.7 g/dL Final         Aneesh Hartman MD

## 2020-01-05 NOTE — PLAN OF CARE
Data: Vital signs within normal limits. Postpartum checks within normal limits - see flow record. Patient eating and drinking normally. Patient able to empty bladder independently. . Patient ambulating independently..   No apparent signs of infection. Lac 2nd degree healing well. Patient Is performing self cares and Is able to care for infant. Positive attachment behaviors are observed with infant. Support persons are present.  Action:  Pain plan was discussed. Patient will request pain med when she is ready for it. Patient was medicated during the shift for pain. See MAR.Patient education done about breastfeeding and postpartum cares. See flow record.  Response:   Patient reassessed within 1 hour after each medication for pain. Patient stated that pain had improved. Patient stated that she was comfortable. .   Plan: Anticipate discharge on 1/6/20.

## 2020-01-05 NOTE — PLAN OF CARE
VSS; postpartum assessment WDL.  Pt independent with self and infant cares.  FF, midline, U/U; light lochia flow - denies clots.   Perineum healing appropriately - ice packs, tucks used for comfort.  Pt reported pain in perineum overnight - prn tylenol and ibuprofen administered per order.  Pt reported pain at tolerable level and was able to sleep between cares.    Pt breastfeeding infant independently; nipples tender - lanolin and hydrogels given.    Pt is bonding appropriately with infant.  Significant other present and very attentive.   Plan to discharge home 1/6/20

## 2020-01-06 VITALS
DIASTOLIC BLOOD PRESSURE: 66 MMHG | TEMPERATURE: 98.1 F | RESPIRATION RATE: 18 BRPM | SYSTOLIC BLOOD PRESSURE: 138 MMHG | OXYGEN SATURATION: 98 % | HEART RATE: 90 BPM

## 2020-01-06 NOTE — DISCHARGE SUMMARY
[facility] Discharge Summary    Chani Martins MRN# [id number]   Age: 24 year old YOB: 1995     Date of Admission:  2020  Date of Discharge::  2020  Admitting Physician:  Margarita Morillo MD  Discharge Physician:  Margarita Morillo MD     Home clinic: [facility]          Admission Diagnoses:   Encounter for induction of labor  Postdates pregnancy          Discharge Diagnosis:   Normal spontaneous vaginal delivery  Intrauterine pregnancy at 41 weeks gestation          Procedures:   Procedure(s): No additional procedures performed       No other procedures performed during this admission           Medications Prior to Admission:     Medications Prior to Admission   Medication Sig Dispense Refill Last Dose     acyclovir (ZOVIRAX) 5 % external ointment Apply topically 6 times daily 30 g 2 Past Month at Unknown time     Prenatal Vit-Fe Fumarate-FA (PRENATAL MULTIVITAMIN W/IRON) 27-0.8 MG tablet Take 1 tablet by mouth every evening    Past Week at Unknown time     Misc. Devices (BREAST PUMP) MISC 1 each daily 1 each 0 Taking             Discharge Medications:     Current Discharge Medication List      CONTINUE these medications which have NOT CHANGED    Details   acyclovir (ZOVIRAX) 5 % external ointment Apply topically 6 times daily  Qty: 30 g, Refills: 2    Associated Diagnoses: Recurrent cold sores      Prenatal Vit-Fe Fumarate-FA (PRENATAL MULTIVITAMIN W/IRON) 27-0.8 MG tablet Take 1 tablet by mouth every evening       Misc. Devices (BREAST PUMP) MISC 1 each daily  Qty: 1 each, Refills: 0    Associated Diagnoses: Encounter for prenatal care in second trimester of first pregnancy                   Consultations:   No consultations were requested during this admission          Brief History of Labor:   23 y/o  admitted at 41 weeks gestation for cervical ripening and delivery; she received several doses of Misoprostol with contractions, then pitocin  "and ultimately, AROM to effect labor; epidural was effective for labor analgesia; she had normal stage 1 progress; labored down briefly then pushed effectively <1 hr for delivery of liveborn male over 2nd degree perineal laceration.  Delayed cord clamping was performed.  Placenta spontaneous.  BW 8 lb 15 oz.  Uterus briefly atonic, responding to massage and OR Misoprostol   ml  \"Alejo\"  Margarita Morillo MD  [facility] Course:   The patient's hospital course was unremarkable.  On discharge, her pain was well controlled. Vaginal bleeding is similar to peak menstrual flow.  Voiding without difficulty.  Ambulating well and tolerating a normal diet.  No fever.  Breastfeeding well.  Infant is stable.  No bowel movement yet.*  She was discharged on post-partum day #2.    Post-partum hemoglobin:   Hemoglobin   Date Value Ref Range Status   01/05/2020 7.8 (L) 11.7 - 15.7 g/dL Final             Discharge Instructions and Follow-Up:   Discharge diet: Regular   Discharge activity: Pelvic rest: abstain from intercourse and do not use tampons for 6 week(s)   Discharge follow-up: Follow up with OB in 6 weeks   Wound care: Drink plenty of fluids           Discharge Disposition:   Discharged to home      Attestation:  I have reviewed today's vital signs, notes, medications, labs and imaging.    Margarita Morillo MD     "

## 2020-01-06 NOTE — PLAN OF CARE
Data: Vital signs within normal limits. Postpartum checks within normal limits - see flow record. Patient eating and drinking normally. Patient able to empty bladder independently and is up ambulating. No apparent signs of infection. Perineum healing well. Patient performing self cares and is able to care for infant.  Action: Patient medicated during the shift for pain and cramping. See MAR. Patient reassessed within 1 hour after each medication and pain was improved - patient stated she was comfortable. Patient education done about pain management, breastfeeding. See flow record.  Response: Positive attachment behaviors observed with infant. Support persons 1 present.   Plan: Anticipate discharge on 1/6/2020.

## 2020-01-06 NOTE — DISCHARGE INSTRUCTIONS
Storing Expressed Milk    You can express your milk and store it in clean containers. Your family or a sitter can feed it to the baby. This way, your baby gets the benefits of your milk even when you can't be there at feeding time.  Type of storage Storage times   Room temperature       At room temperature (up to 78 F or 26 C)    Tip: Keep the container clean, covered, and cool. 3 to 4 hours is best; 6 to 8 hours is acceptable under very clean conditions   Refrigerator       In a refrigerator (less than 39 F or less than 4 C)    Tip: Place milk in the back of the main section of the refrigerator. 72 hours is best; up to 8 days is acceptable under very clean conditions   Freezer        In a freezer (0 F or -17 C)    Tip: Store milk toward the back of the freezer. 6 months is best; 12 months is acceptable   Guidelines for milk storage  Always use a clean container to collect and store milk. Never pour warm expressed milk into a bottle with cold milk. And be sure to label and date each bottle or bag of milk. To store milk safely, see the chart above.  Warming stored milk  Thaw frozen milk in the refrigerator or in a bowl of warm water. It s a good idea to warm refrigerated milk before using it. For your baby s safety:    Use the oldest milk first.    Warm a container of milk by putting it in a bowl of warm (not hot) water for a few minutes. Or use a bottle warmer set on low.    Gently swirl the milk to mix it. Then place a few drops on your wrist. The milk should be near room temperature.    Don t put the milk in a microwave. This could create pockets of hot liquid that can burn your baby s mouth.  Date Last Reviewed: 3/1/2017    9305-5678 [employer]. [address] [place] [address], [place] [address]. All rights reserved. This information is not intended as a substitute for professional medical care. Always follow your healthcare professional's instructions.        Common Questions About Breastfeeding    Here  are answers to some questions new mothers often ask.  Is my baby getting enough milk?  When it comes to feeding your baby, what goes in must come out. You can tell how much milk your baby is getting by keeping track of the baby s diapers:    By the first 24 hours after birth: The baby should have 1 to 2 wet diapers and 1 to 2 soiled (poopy) diapers. The poop will be dark and tar-like (meconium).    The second and third day after birth: The baby should have 3 to 4 wet diapers and 2 to 3 soiled diapers. The poop will be greenish brown (transitional stool).    After the first 4 or 5 days: The baby should have at least 5 to 6 wet diapers and at least 3 to 4 soiled diapers a day. The poop will be yellow and loose.  How can I tell when my baby s hungry?  Don t wait until your baby cries to feed him or her. Newborns should be nursed as soon as they show any hunger signs. These include:    Increased alertness or activity    Rooting reflex (nuzzling against your breast)    Smacking lips or opening and closing the mouth    Sucking on the hand or fingers    Crying (late sign)  How often should I feed my baby?  Feed your baby as often and as long as he or she wants. Make sure you re nursing at least 8 to 12 times per day. Some of these feedings might be close together (cluster feeding), and then your baby might rest for several hours. Let your baby nurse as long as he or she would like; when done, he or she will stop swallowing, relax his or her hands and fall asleep. If your baby hasn't nursed in 4 hours, you may need to wake your baby and offer your milk. Newborns tend to be very sleepy and sometimes will not wake to eat. If your baby doesn't seem interested in nursing, place him or her in just diapers against your bare skin (skin to skin) and continue to offer your milk. And, if your baby fusses when feeding, don't worry. Some babies get distracted easily. To calm your baby, choose a quiet place for feeding. It may also help  "if you breastfeed in the same place in your home each time. If your baby is crying, it may be difficult for him or her to latch on. Gently place your finger in the mouth to help him or her feel calm, and then offer your milk again.  Will I spoil my baby?  Newborns can't be spoiled. When your baby needs comfort, food, or holding, his or her crying will let you know. When you respond to your baby's needs, you help him or her learn to trust you. This is a time to shower your baby with love and attend to his or her needs.  Why is my baby so hungry?  Babies eat a lot. Their stomachs are very small when they are born, and mother's milk is easily and quickly digested. This is even truer during a growth spurt. Growth spurts usually happen around 2 and 6 weeks of age. They happen again at 3 and 6 months. During these times, your baby will breastfeed more often. Don t be alarmed. Your baby will not need formula or supplements. You will make all the milk that your baby needs because milk production is a \"supply and demand\" situation (baby's demand will increase mom's supply).  Date Last Reviewed: 2018-2019 [employer]. [address] [place] [address], [place] [address]. All rights reserved. This information is not intended as a substitute for professional medical care. Always follow your healthcare professional's instructions.        Breastfeeding FAQs  How often should I nurse?  Feed your  whenever he or she show signs of hunger. A general guideline is to nurse around 8 to 12 times every 24 hours, or about every 2 to 3 hours. With time and patience, every mother-baby pair will develop their own schedule and feeding pattern.  How many months should I nurse?  The American College of Obstetricians and Gynecologists and the American Academy of Pediatrics both recommend that mothers start breastfeeding as soon as possible after birth. Both support  breastfeeding-only  for the first 6 months of life. At 6 " months, your baby may slowly start having solid foods as well. But continue breastfeeding at least through the baby s first birthday. After the first birthday, you and your baby can stop or continue breastfeeding as long as you want it to happen.  Is my baby getting enough milk?  There are a few ways to check if your baby is getting enough milk.  Latching on  You know your baby is getting milk if you hear gulping and swallowing sounds, not just sucking. Look for your baby steadily moving his or her jaw open and closed as another sign of proper  latching on.   Urine output and stool frequency  You can also tell how much milk your baby is getting by keeping track of your baby s diapers. By the end of the first week of life:    Your baby should have about 1 wet diaper on day 1, and  2 wet diapers on day 2. This should increase each day by 1 more wet diaper, up to 6 wet diapers on day 6 and then about 6 wet diapers every day. The urine will be a pale yellow color, not dark yellow or orange. Wet diapers should stay around this amount as your  baby gets older.    Your baby should have 3 or 4 stools per day. It is not uncommon for a  baby to pass stool after each feeding. In the second to fourth week of life, the number of stools can increase to about 5 per day. After 1 month, the number of stools usually lessens. It might be 1 or 2 a day. Some babies may have a day or days with no stool. In these cases, stool should be in larger amounts when it is passed.     Weight  It s normal for your baby to lose some weight during the first 3 to 4 days of life. A baby might lose up to 7% of his or her birth weight during this time. Then your baby should start gaining again. By the end of the second week, he or she will back to birth weight.  Does my baby need vitamins?  All  infants should get vitamin D supplements. Your baby s healthcare provider will prescribe them. This may be at least 400 international  units (IU) a day. Your baby usually will not need any other supplements. When your baby is 6 months old, you may start to offer baby foods that contain iron.  What should I do if my breasts become swollen, tender, or sore?  These symptoms are most often because your breasts are too full of milk (engorged). You are making more milk than your baby is drinking. This may cause pain and make it harder for your baby to nurse. If this happens, try the following:    Keep nursing. This is a temporary condition. It gets better once you can get your baby to drink more milk. Be sure to breastfeed more often and let your baby feed until he or she is finished.    Express some milk before you breastfeed. Do this manually or with a breast pump. This will soften the darker area around the nipple (areola) so your baby can latch deeper to begin feeding.    Use a warm compress. This can be a towel or paper diaper soaked in warm water. Or take a warm shower before feeding. Some women have found that switching off between a cold compress and a warm one gives them relief. If you feel comfortable with this, you can try it too. If you use an ice pack, wrap it in a thin towel to protect your skin.    Take acetaminophen for continued pain. The medicine is safe to take every now and then during breastfeeding.  If your nipples or breasts continue to hurt, call your healthcare provider. Nipple and breast problems need to be looked at and treated as soon as possible. But don t get discouraged and stop nursing.    When to seek medical advice  Unless your baby s healthcare provider advises otherwise, call the provider right away if your baby has any of the following:    Fever (see Fever and children, below)    Repeated vomiting    Does not appear to be alert, refuses to nurse, or is sleeping too much, such as through feedings    Has signs of dehydration. These include fewer wet diapers than normal or no urine for 8 hours, or the urine appears dark.  Or your baby has no tears when crying,  sunken eyes,  or dry mouth.    Is not gaining weight or losing weight  Call your own healthcare provider right away if you:    Have a fever of 100.4 F (38 C) or higher, or as directed by your provider    Have redness, warmth, pain, or unusual discharge from your breasts    Have such painful nipples and breasts that you want to stop nursing    Have a hard lump in your breast    Have lower belly (abdominal) pain or cramping when you are not breastfeeding    Have pain or burning when you pass urine    Have unexpected vaginal bleeding or foul-smelling discharge  Fever and children  Always use a digital thermometer to check your child s temperature. Never use a mercury thermometer.  For infants and toddlers, be sure to use a rectal thermometer correctly. A rectal thermometer may accidentally poke a hole in (perforate) the rectum. It may also pass on germs from the stool. Always follow the product maker s directions for proper use. If you don t feel comfortable taking a rectal temperature, use another method. When you talk to your child s healthcare provider, tell him or her which method you used to take your child s temperature.  Here are guidelines for fever temperature. Ear temperatures aren t accurate before 6 months of age. Don t take an oral temperature until your child is at least 4 years old.  Infant under 3 months old:    Ask your child s healthcare provider how you should take the temperature.    Rectal or forehead (temporal artery) temperature of 100.4 F (38 C) or higher, or as directed by the provider    Armpit temperature of 99 F (37.2 C) or higher, or as directed by the provider   Date Last Reviewed: 3/1/2017    5100-3951 [employer]. [address] [place] [address]. All rights reserved. This information is not intended as a substitute for professional medical care. Always follow your healthcare professional's instructions.      Postpartum  Vaginal Delivery Instructions    Activity       Ask family and friends for help when you need it.    Do not place anything in your vagina for 6 weeks.    You are not restricted on other activities, but take it easy for a few weeks to allow your body to recover from delivery.  You are able to do any activities you feel up to that point.    No driving until you have stopped taking your pain medications (usually two weeks after delivery).     Call your health care provider if you have any of these symptoms:       Increased pain, swelling, redness, or fluid around your stiches from an episiotomy or perineal tear.    A fever above 100.4 F (38 C) with or without chills when placing a thermometer under your tongue.    You soak a sanitary pad with blood within 1 hour, or you see blood clots larger than a golf ball.    Bleeding that lasts more than 6 weeks.    Vaginal discharge that smells bad.    Severe pain, cramping or tenderness in your lower belly area.    A need to urinate more frequently (use the toilet more often), more urgently (use the toilet very quickly), or it burns when you urinate.    Nausea and vomiting.    Redness, swelling or pain around a vein in your leg.    Problems breastfeeding or a red or painful area on your breast.    Chest pain and cough or are gasping for air.    Problems coping with sadness, anxiety, or depression.  If you have any concerns about hurting yourself or the baby, call your provider immediately.     You have questions or concerns after you return home.     Keep your hands clean:  Always wash your hands before touching your perineal area and stitches.  This helps reduce your risk of infection.  If your hands aren't dirty, you may use an alcohol hand-rub to clean your hands. Keep your nails clean and short.

## 2020-01-06 NOTE — PROGRESS NOTES
[facility] Obstetrics Post-Partum Progress Note          Assessment and Plan:    Assessment:   Post-partum day #2  Normal spontaneous vaginal delivery  L&D complications: None      Doing well.  No excessive bleeding  Pain well-controlled.      Plan:   Discharge home           Interval History:   Doing well.  Pain is well-controlled.  No fevers.  No history of foul-smelling vaginal discharge.  Good appetite.  Denies chest pain, shortness of breath, nausea or vomiting.  Vaginal bleeding is similar to a heavy menstrual flow.  Ambulatory.  Breastfeeding well.          Significant Problems:    Principal Problem:    41 weeks gestation of pregnancy  Active Problems:    Encounter for elective induction of labor     (normal spontaneous vaginal delivery)            Review of Systems:    The patient denies any chest pain, shortness of breath, excessive pain, fever, chills, purulent drainage from the wound, nausea or vomiting.          Medications:   All medications related to the patient's surgery have been reviewed          Physical Exam:     All vitals stable  Patient Vitals for the past 8 hrs:   BP Temp Temp src Resp   20 0805 138/66 98.1  F (36.7  C) Oral 18     Uterine fundus is firm, non-tender and at the level of the umbilicus          Data:     All laboratory data related to this surgery reviewed  Hemoglobin   Date Value Ref Range Status   2020 7.8 (L) 11.7 - 15.7 g/dL Final   2020 10.6 (L) 11.7 - 15.7 g/dL Final   2019 11.5 (L) 11.7 - 15.7 g/dL Final   2019 12.5 11.7 - 15.7 g/dL Final   2019 11.5 (L) 11.7 - 15.7 g/dL Final     No imaging studies have been ordered    Margarita Morillo MD

## 2020-01-06 NOTE — PLAN OF CARE
Patient progressing well.  Ambulating, Eating and voiding well. Independent with mother/baby cares. Bonding well with infant.  Breast feeding is going well, baby is latching well and feeding for good lengths of time. Patient rates perineum Pain comfortably Manageable.  Taking Tylenol when needed with good relief.  Made plan with patient to bring pain medication when patient requests it. Patient declined tylenol at time of assessment.  Patient encouraged to report increased bleeding or clots. Significant other rooming in and is supportive and helpful.

## 2020-01-06 NOTE — PROGRESS NOTES
Pt left via ambulatory with her baby and S.O. after discharge instructions/teaching were reviewed.

## 2020-01-16 ENCOUNTER — TELEPHONE (OUTPATIENT)
Dept: OBGYN | Facility: CLINIC | Age: 25
End: 2020-01-16

## 2020-01-16 ENCOUNTER — NURSE TRIAGE (OUTPATIENT)
Dept: NURSING | Facility: CLINIC | Age: 25
End: 2020-01-16

## 2020-01-17 NOTE — TELEPHONE ENCOUNTER
Clinic Action Needed:Yes, [phone] (home)     Reason for Call:Patient calling stating she is breastfeeding 12 day old baby. Reporting symptoms of breast pain and redness starting today in the left breast. Patient is afebrile. Denies lump or swelling.   Patient continues to breastfeed.    Per guidelines advised to call provider with in 24 hours.  Message routed to Dr Ilia stewart.  Advised to call back if any symptoms change or increase prior to call back.     Mari Lyons RN  [facility]    Reason for Disposition    [1] Breast looks infected (e.g, spreading redness) AND [2] no fever    Additional Information    Patient is breastfeeding    Negative: Baby is difficult to awaken or keep awake (Exception: baby needs normal sleep)    Negative: [1] Baby < 1 year old AND [2] very weak (doesn't move or make eye contact)    Negative: Sounds like a life-threatening emergency to the triager    Negative: [1] Baby < 12 weeks old AND [2] fever  > 100.4 F (38.0 C) rectally    Negative: Baby has suspected dehydration (e.g., no urine > 8 hours, brick dust urine 3 or more times, sunken soft spot, very dry mouth) (Exception: no urine > 12 hours on day 2 of life OR > 8 hours on day 3-4 of life and without other signs of dehydration)    Negative: [1] Baby day 2 of life AND [2] no urine > 12 hours AND [3] after given supplemental feeding    Negative: [1] Baby day 3-4 of life AND [2] no urine > 8 hours AND [3] after given supplemental feeding    Negative: [1] Baby < 1 month old ()  AND [2] starts to look or act sick in any way    Negative: [1] Baby < 1 month old AND [2] refuses to breastfeed AND [3] for > 6 hours    Negative: [1] Baby < 12 months AND [2] weak suck / weak muscles AND [3] new onset    Negative: Baby sounds very sick to the triager    Negative: Patient (mother) sounds very sick or weak to the triager    Negative: [1] Baby refuses to drink anything (including supplemental formula or expressed  breastmilk) AND [2] for > 8 hours    Negative: [1] Breast looks infected (e.g, spreading redness, feels hot or painful to touch) AND [2] fever > 100.4 F (38.0 C)    Negative: [1] Baby day 2 of life AND [2] no urine > 12 hours    Negative: [1] Baby day 3 or 4 of life AND [2] no urine > 8 hours    Negative: [1] Baby day 2 of life AND [2] requires supplemental feeding to urinate every 12 hours    Negative: [1] Baby day 2 or 3 of life AND [2] no stool in 24 hours AND [3] exclusively     Negative: [1] Baby day 3 or 4 of life AND [2] requires supplemental feeding to urinate every 8 hours    Negative: [1] Baby day 4 of life or later AND [2] bowel movements are < 3 per day (should be yellow-colored by day 5) (Exception: normal infrequent stools after 4 weeks)    Negative: Wet diapers are < 6 per day  (Exception:  can be normal while milk volume is increasing during 1- 4 days of life)    Negative: [1] Baby < 1 month old AND [2] seems hungry after feedings (cries after nursing OR wants to feed over 12 times/day)    Negative: [1] Baby < 1 month old AND [2] needs to be repeatedly awakened for feedings (every 3 hours during the day) BUT [3] alert and feeds well when awakened    Protocols used: POSTPARTUM - BREASTFEEDING [id number], BREAST SYMPTOMS-A-AH

## 2020-01-17 NOTE — TELEPHONE ENCOUNTER
S-(situation): Redness is improving and has been letting baby nurse more on the affected breast.  Has been applying warm compresses to the breast.    B-(background): S/p 20, .    A-(assessment): No fever, headaches or body aches,  redness is not warm to the touch, no red streaking. Positive for breast tenderness.  Concerned if treating correctly.    R-(recommendations): Pt was advised that she likely has a plugged milk duct and to breast feed first on the affected side.  Apply warm compresses or take a warm shower before breastfeeding.  While feeding, gently massage the breast downward and toward the nipple.  If she develops a fever, headache, body aches or flu like symptoms she will need to be seen in the ER this weekend.    Pt agrees with this plan.    Kaycee Gonzalez  [facility] RN

## 2020-01-17 NOTE — TELEPHONE ENCOUNTER
Clinic Action Needed:Yes, [phone] (home)   Pharmacy [facility] [place]  Reason for Call:Patient calling stating she is breastfeeding 12 day old baby. Reporting symptoms of breast pain and redness starting today in the left breast. Patient is afebrile. Denies lump or swelling.   Patient continues to breastfeed.    Per guidelines advised to call provider with in 24 hours.  Message routed to Dr Ilia stewart.  Advised to call back if any symptoms change or increase prior to call back.     Mari Lyons RN  [facility]        Reason for Disposition    [1] Breast looks infected (e.g, spreading redness) AND [2] no fever    Additional Information    Patient is breastfeeding    Negative: Baby is difficult to awaken or keep awake (Exception: baby needs normal sleep)    Negative: [1] Baby < 1 year old AND [2] very weak (doesn't move or make eye contact)    Negative: Sounds like a life-threatening emergency to the triager    Negative: [1] Baby < 12 weeks old AND [2] fever  > 100.4 F (38.0 C) rectally    Negative: Baby has suspected dehydration (e.g., no urine > 8 hours, brick dust urine 3 or more times, sunken soft spot, very dry mouth) (Exception: no urine > 12 hours on day 2 of life OR > 8 hours on day 3-4 of life and without other signs of dehydration)    Negative: [1] Baby day 2 of life AND [2] no urine > 12 hours AND [3] after given supplemental feeding    Negative: [1] Baby day 3-4 of life AND [2] no urine > 8 hours AND [3] after given supplemental feeding    Negative: [1] Baby < 1 month old ()  AND [2] starts to look or act sick in any way    Negative: [1] Baby < 1 month old AND [2] refuses to breastfeed AND [3] for > 6 hours    Negative: [1] Baby < 12 months AND [2] weak suck / weak muscles AND [3] new onset    Negative: Baby sounds very sick to the triager    Negative: Patient (mother) sounds very sick or weak to the triager    Negative: [1] Baby refuses to drink anything (including  supplemental formula or expressed breastmilk) AND [2] for > 8 hours    Negative: [1] Breast looks infected (e.g, spreading redness, feels hot or painful to touch) AND [2] fever > 100.4 F (38.0 C)    Negative: [1] Baby day 2 of life AND [2] no urine > 12 hours    Negative: [1] Baby day 3 or 4 of life AND [2] no urine > 8 hours    Negative: [1] Baby day 2 of life AND [2] requires supplemental feeding to urinate every 12 hours    Negative: [1] Baby day 2 or 3 of life AND [2] no stool in 24 hours AND [3] exclusively     Negative: [1] Baby day 3 or 4 of life AND [2] requires supplemental feeding to urinate every 8 hours    Negative: [1] Baby day 4 of life or later AND [2] bowel movements are < 3 per day (should be yellow-colored by day 5) (Exception: normal infrequent stools after 4 weeks)    Negative: Wet diapers are < 6 per day  (Exception:  can be normal while milk volume is increasing during 1- 4 days of life)    Negative: [1] Baby < 1 month old AND [2] seems hungry after feedings (cries after nursing OR wants to feed over 12 times/day)    Negative: [1] Baby < 1 month old AND [2] needs to be repeatedly awakened for feedings (every 3 hours during the day) BUT [3] alert and feeds well when awakened    Protocols used: POSTPARTUM - BREASTFEEDING [id number], BREAST SYMPTOMS-A-AH

## 2020-01-29 ENCOUNTER — PRENATAL OFFICE VISIT (OUTPATIENT)
Dept: OBGYN | Facility: CLINIC | Age: 25
End: 2020-01-29
Payer: COMMERCIAL

## 2020-01-29 ENCOUNTER — TELEPHONE (OUTPATIENT)
Dept: OBGYN | Facility: CLINIC | Age: 25
End: 2020-01-29

## 2020-01-29 VITALS
BODY MASS INDEX: 35.24 KG/M2 | DIASTOLIC BLOOD PRESSURE: 57 MMHG | HEART RATE: 65 BPM | TEMPERATURE: 96.7 F | SYSTOLIC BLOOD PRESSURE: 110 MMHG | HEIGHT: 64 IN | WEIGHT: 206.4 LBS | RESPIRATION RATE: 16 BRPM

## 2020-01-29 DIAGNOSIS — N89.8 VAGINAL ODOR: Primary | ICD-10-CM

## 2020-01-29 LAB
SPECIMEN SOURCE: ABNORMAL
WET PREP SPEC: ABNORMAL

## 2020-01-29 PROCEDURE — 99024 POSTOP FOLLOW-UP VISIT: CPT | Performed by: OBSTETRICS & GYNECOLOGY

## 2020-01-29 PROCEDURE — 87210 SMEAR WET MOUNT SALINE/INK: CPT | Performed by: OBSTETRICS & GYNECOLOGY

## 2020-01-29 ASSESSMENT — PATIENT HEALTH QUESTIONNAIRE - PHQ9
5. POOR APPETITE OR OVEREATING: NOT AT ALL
SUM OF ALL RESPONSES TO PHQ QUESTIONS 1-9: 3

## 2020-01-29 ASSESSMENT — ANXIETY QUESTIONNAIRES
GAD7 TOTAL SCORE: 3
2. NOT BEING ABLE TO STOP OR CONTROL WORRYING: SEVERAL DAYS
1. FEELING NERVOUS, ANXIOUS, OR ON EDGE: SEVERAL DAYS
IF YOU CHECKED OFF ANY PROBLEMS ON THIS QUESTIONNAIRE, HOW DIFFICULT HAVE THESE PROBLEMS MADE IT FOR YOU TO DO YOUR WORK, TAKE CARE OF THINGS AT HOME, OR GET ALONG WITH OTHER PEOPLE: NOT DIFFICULT AT ALL
7. FEELING AFRAID AS IF SOMETHING AWFUL MIGHT HAPPEN: SEVERAL DAYS
3. WORRYING TOO MUCH ABOUT DIFFERENT THINGS: NOT AT ALL
6. BECOMING EASILY ANNOYED OR IRRITABLE: NOT AT ALL
5. BEING SO RESTLESS THAT IT IS HARD TO SIT STILL: NOT AT ALL

## 2020-01-29 ASSESSMENT — MIFFLIN-ST. JEOR: SCORE: 1681.22

## 2020-01-29 NOTE — TELEPHONE ENCOUNTER
Reason for call:  Patient reporting a symptom    Symptom or request: odor    Duration (how long have symptoms been present): x 1 day    Have you been treated for this before? No    Additional comments: pt calling stating she has been experiencing an odor and didn't know if she should be seen. 3 wks PP    Phone Number patient can be reached at:  Home number on file [phone] (home)    Best Time:  Any     Can we leave a detailed message on this number:  YES    Call taken on 1/29/2020 at 10:52 AM by Sara Saldaña

## 2020-01-29 NOTE — NURSING NOTE
"Initial /57 (BP Location: Left arm, Patient Position: Chair, Cuff Size: Adult Regular)   Pulse 65   Temp 96.7  F (35.9  C) (Tympanic)   Resp 16   Ht 1.626 m (5' 4\")   Wt 93.6 kg (206 lb 6.4 oz)   LMP 03/21/2019   Breastfeeding Yes   BMI 35.43 kg/m   Estimated body mass index is 35.43 kg/m  as calculated from the following:    Height as of this encounter: 1.626 m (5' 4\").    Weight as of this encounter: 93.6 kg (206 lb 6.4 oz). .      "

## 2020-01-29 NOTE — PROGRESS NOTES
"[facility]    CC: Postpartum Visit/foul vaginal smell     S: Pt presents for 1 day of foul-smelling vaginal discharge. Her lochia is decreasing to light brown. No itching or burning. Discomfort at vaginal laceration. No n/v. She did have two days of fever last week to 101.6F and malaise, sore throat, but no other symptoms. No uterine tenderness or cramping. Not taking any ibuprofen or tylenol.     O:   VS:   Patient Vitals for the past 24 hrs:   BP Temp Temp src Pulse Resp Height Weight   01/29/20 1501 110/57 96.7  F (35.9  C) Tympanic 65 16 1.626 m (5' 4\") 93.6 kg (206 lb 6.4 oz)     General: NAD  CV: Regular rate, warm and well perfused  Resp: breathing comfortably on room air   Abdomen: soft, non-tender, nondistended  : suture present at perineum, but healing well, closed cervix on speculum exam, thin brown discharge, non-tender on bimanual exam, small involuted uterus  Extremities: non-tender, non-edematous     A/P: 24year old P1 3 wks pp with foul-smelling vaginal discharge  Wet prep collected.   Unlikely endometritis, given no uterine tenderness, but precautions were reviewed for this    Return for 6 wk pp as scheduled.       Jes Cantor MD, MD  [facility]   1/29/2020 3:24 PM    "

## 2020-01-29 NOTE — TELEPHONE ENCOUNTER
Return call to patient.  Spoke with patient on the phone.    S-(situation): Patient reports very strong fishy foul odor that started yesterday. Patient reports prior to yesterday, patient did not notice any unusual odor. Patient currently denies fever although reports temperature of 101 last week for 24 hours. Patient reports headache for 5 days last week as well but this has now resolved. Patient denies abnormal discharge. Patient denies any burning or itching in the vaginal area. Patient denies UTI symptoms. Patient breastfeeding.    B-(background): vaginal delivery 1/4/2020    A-(assessment): foul vaginal odor.    R-(recommendations): recommend an appointment for further evaluation.     Patient scheduled for today.    Lizbeth Dewitt   [facility]  [place]

## 2020-01-30 DIAGNOSIS — N76.0 BACTERIAL VAGINOSIS: Primary | ICD-10-CM

## 2020-01-30 DIAGNOSIS — B96.89 BACTERIAL VAGINOSIS: Primary | ICD-10-CM

## 2020-01-30 RX ORDER — METRONIDAZOLE 500 MG/1
500 TABLET ORAL 2 TIMES DAILY
Qty: 14 TABLET | Refills: 0 | Status: SHIPPED | OUTPATIENT
Start: 2020-01-30 | End: 2020-02-14

## 2020-01-30 ASSESSMENT — ANXIETY QUESTIONNAIRES: GAD7 TOTAL SCORE: 3

## 2020-02-02 ENCOUNTER — MYC MEDICAL ADVICE (OUTPATIENT)
Dept: OBGYN | Facility: CLINIC | Age: 25
End: 2020-02-02

## 2020-02-03 ENCOUNTER — DOCUMENTATION ONLY (OUTPATIENT)
Dept: FAMILY MEDICINE | Facility: CLINIC | Age: 25
End: 2020-02-03

## 2020-02-03 ENCOUNTER — MEDICAL CORRESPONDENCE (OUTPATIENT)
Dept: HEALTH INFORMATION MANAGEMENT | Facility: CLINIC | Age: 25
End: 2020-02-03

## 2020-02-03 ENCOUNTER — MYC MEDICAL ADVICE (OUTPATIENT)
Dept: FAMILY MEDICINE | Facility: CLINIC | Age: 25
End: 2020-02-03

## 2020-02-04 NOTE — PROGRESS NOTES
Patient was in today for child's 1 month well child.  [place] form completed.  Score of 2.  Provider notified both visually and also verbally.      Mitchel Pineda M.A.

## 2020-02-14 ENCOUNTER — PRENATAL OFFICE VISIT (OUTPATIENT)
Dept: OBGYN | Facility: CLINIC | Age: 25
End: 2020-02-14
Payer: COMMERCIAL

## 2020-02-14 VITALS
WEIGHT: 206 LBS | HEIGHT: 64 IN | HEART RATE: 62 BPM | DIASTOLIC BLOOD PRESSURE: 50 MMHG | BODY MASS INDEX: 35.17 KG/M2 | TEMPERATURE: 97.5 F | SYSTOLIC BLOOD PRESSURE: 108 MMHG | RESPIRATION RATE: 18 BRPM

## 2020-02-14 DIAGNOSIS — Z11.3 SCREEN FOR STD (SEXUALLY TRANSMITTED DISEASE): ICD-10-CM

## 2020-02-14 PROBLEM — Z3A.41 41 WEEKS GESTATION OF PREGNANCY: Status: RESOLVED | Noted: 2020-01-04 | Resolved: 2020-02-14

## 2020-02-14 PROBLEM — Z34.90 ENCOUNTER FOR ELECTIVE INDUCTION OF LABOR: Status: RESOLVED | Noted: 2020-01-02 | Resolved: 2020-02-14

## 2020-02-14 PROBLEM — Z34.00 PRENATAL CARE, FIRST PREGNANCY: Status: RESOLVED | Noted: 2019-04-21 | Resolved: 2020-02-14

## 2020-02-14 PROBLEM — O48.0 41 WEEKS GESTATION OF PREGNANCY: Status: RESOLVED | Noted: 2020-01-04 | Resolved: 2020-02-14

## 2020-02-14 PROCEDURE — 87491 CHLMYD TRACH DNA AMP PROBE: CPT | Performed by: OBSTETRICS & GYNECOLOGY

## 2020-02-14 PROCEDURE — 87591 N.GONORRHOEAE DNA AMP PROB: CPT | Performed by: OBSTETRICS & GYNECOLOGY

## 2020-02-14 PROCEDURE — 99207 ZZC POST PARTUM EXAM: CPT | Performed by: OBSTETRICS & GYNECOLOGY

## 2020-02-14 ASSESSMENT — ANXIETY QUESTIONNAIRES
GAD7 TOTAL SCORE: 2
2. NOT BEING ABLE TO STOP OR CONTROL WORRYING: NOT AT ALL
IF YOU CHECKED OFF ANY PROBLEMS ON THIS QUESTIONNAIRE, HOW DIFFICULT HAVE THESE PROBLEMS MADE IT FOR YOU TO DO YOUR WORK, TAKE CARE OF THINGS AT HOME, OR GET ALONG WITH OTHER PEOPLE: NOT DIFFICULT AT ALL
5. BEING SO RESTLESS THAT IT IS HARD TO SIT STILL: NOT AT ALL
7. FEELING AFRAID AS IF SOMETHING AWFUL MIGHT HAPPEN: SEVERAL DAYS
1. FEELING NERVOUS, ANXIOUS, OR ON EDGE: SEVERAL DAYS
3. WORRYING TOO MUCH ABOUT DIFFERENT THINGS: NOT AT ALL
6. BECOMING EASILY ANNOYED OR IRRITABLE: NOT AT ALL

## 2020-02-14 ASSESSMENT — PATIENT HEALTH QUESTIONNAIRE - PHQ9
SUM OF ALL RESPONSES TO PHQ QUESTIONS 1-9: 2
5. POOR APPETITE OR OVEREATING: NOT AT ALL

## 2020-02-14 ASSESSMENT — MIFFLIN-ST. JEOR: SCORE: 1679.41

## 2020-02-14 NOTE — PROGRESS NOTES
"Chani is a 24 year old female 6 weeks S/P  of a liveborn baby boy.  The delivery was uncomplicated.  She is not still bleeding.  She is breastfeeding, and has selected condoms for birth control.  Her last PAP smear was <3 years, and was Normal; her  PHQ-9 inventory score is: 2    Exam: /50 (BP Location: Right arm, Patient Position: Chair, Cuff Size: Adult Large)   Pulse 62   Temp 97.5  F (36.4  C) (Tympanic)   Resp 18   Ht 1.626 m (5' 4\")   Wt 93.4 kg (206 lb)   LMP 2019   Breastfeeding Yes   BMI 35.36 kg/m    perineal laceration healed, cervix parous, uterus small, non-tender, no adnexal masses, normal lochia and Gen probe taken    Assessment:  Postpartum    Plan:  RTC for annual exams and PRN    Margarita Morillo MD  [facility]      "

## 2020-02-14 NOTE — NURSING NOTE
"Initial /50 (BP Location: Right arm, Patient Position: Chair, Cuff Size: Adult Large)   Pulse 62   Temp 97.5  F (36.4  C) (Tympanic)   Resp 18   Ht 1.626 m (5' 4\")   Wt 93.4 kg (206 lb)   LMP 03/21/2019   Breastfeeding Yes   BMI 35.36 kg/m   Estimated body mass index is 35.36 kg/m  as calculated from the following:    Height as of this encounter: 1.626 m (5' 4\").    Weight as of this encounter: 93.4 kg (206 lb). .      "

## 2020-02-15 ASSESSMENT — ANXIETY QUESTIONNAIRES: GAD7 TOTAL SCORE: 2

## 2020-05-04 ENCOUNTER — MYC MEDICAL ADVICE (OUTPATIENT)
Dept: FAMILY MEDICINE | Facility: CLINIC | Age: 25
End: 2020-05-04

## 2020-05-28 ENCOUNTER — MYC MEDICAL ADVICE (OUTPATIENT)
Dept: FAMILY MEDICINE | Facility: CLINIC | Age: 25
End: 2020-05-28

## 2020-08-18 ENCOUNTER — OFFICE VISIT (OUTPATIENT)
Dept: FAMILY MEDICINE | Facility: CLINIC | Age: 25
End: 2020-08-18
Payer: COMMERCIAL

## 2020-08-18 VITALS
DIASTOLIC BLOOD PRESSURE: 82 MMHG | HEART RATE: 80 BPM | WEIGHT: 226 LBS | TEMPERATURE: 97.3 F | OXYGEN SATURATION: 99 % | SYSTOLIC BLOOD PRESSURE: 136 MMHG | HEIGHT: 63 IN | BODY MASS INDEX: 40.04 KG/M2 | RESPIRATION RATE: 14 BRPM

## 2020-08-18 DIAGNOSIS — R20.0 NUMBNESS AND TINGLING OF BOTH FEET: Primary | ICD-10-CM

## 2020-08-18 DIAGNOSIS — R20.2 NUMBNESS AND TINGLING OF BOTH FEET: Primary | ICD-10-CM

## 2020-08-18 DIAGNOSIS — E66.01 MORBID OBESITY (H): ICD-10-CM

## 2020-08-18 LAB
ALBUMIN SERPL-MCNC: 3.6 G/DL (ref 3.4–5)
ALP SERPL-CCNC: 102 U/L (ref 40–150)
ALT SERPL W P-5'-P-CCNC: 33 U/L (ref 0–50)
ANION GAP SERPL CALCULATED.3IONS-SCNC: 6 MMOL/L (ref 3–14)
AST SERPL W P-5'-P-CCNC: 23 U/L (ref 0–45)
BASOPHILS # BLD AUTO: 0 10E9/L (ref 0–0.2)
BASOPHILS NFR BLD AUTO: 0.3 %
BILIRUB SERPL-MCNC: 0.3 MG/DL (ref 0.2–1.3)
BUN SERPL-MCNC: 14 MG/DL (ref 7–30)
CALCIUM SERPL-MCNC: 9 MG/DL (ref 8.5–10.1)
CHLORIDE SERPL-SCNC: 109 MMOL/L (ref 94–109)
CO2 SERPL-SCNC: 24 MMOL/L (ref 20–32)
CREAT SERPL-MCNC: 0.67 MG/DL (ref 0.52–1.04)
DIFFERENTIAL METHOD BLD: NORMAL
EOSINOPHIL # BLD AUTO: 0.2 10E9/L (ref 0–0.7)
EOSINOPHIL NFR BLD AUTO: 2.4 %
ERYTHROCYTE [DISTWIDTH] IN BLOOD BY AUTOMATED COUNT: 12.2 % (ref 10–15)
GFR SERPL CREATININE-BSD FRML MDRD: >90 ML/MIN/{1.73_M2}
GLUCOSE SERPL-MCNC: 105 MG/DL (ref 70–99)
HBA1C MFR BLD: 5.1 % (ref 0–5.6)
HCT VFR BLD AUTO: 39.9 % (ref 35–47)
HGB BLD-MCNC: 13.6 G/DL (ref 11.7–15.7)
IRON SATN MFR SERPL: 24 % (ref 15–46)
IRON SERPL-MCNC: 98 UG/DL (ref 35–180)
LYMPHOCYTES # BLD AUTO: 2.1 10E9/L (ref 0.8–5.3)
LYMPHOCYTES NFR BLD AUTO: 29.5 %
MCH RBC QN AUTO: 29.6 PG (ref 26.5–33)
MCHC RBC AUTO-ENTMCNC: 34.1 G/DL (ref 31.5–36.5)
MCV RBC AUTO: 87 FL (ref 78–100)
MONOCYTES # BLD AUTO: 0.7 10E9/L (ref 0–1.3)
MONOCYTES NFR BLD AUTO: 9.1 %
NEUTROPHILS # BLD AUTO: 4.2 10E9/L (ref 1.6–8.3)
NEUTROPHILS NFR BLD AUTO: 58.7 %
PLATELET # BLD AUTO: 300 10E9/L (ref 150–450)
POTASSIUM SERPL-SCNC: 3.8 MMOL/L (ref 3.4–5.3)
PROT SERPL-MCNC: 7.9 G/DL (ref 6.8–8.8)
RBC # BLD AUTO: 4.6 10E12/L (ref 3.8–5.2)
SODIUM SERPL-SCNC: 139 MMOL/L (ref 133–144)
T4 FREE SERPL-MCNC: 0.86 NG/DL (ref 0.76–1.46)
TIBC SERPL-MCNC: 409 UG/DL (ref 240–430)
TSH SERPL DL<=0.005 MIU/L-ACNC: 4.79 MU/L (ref 0.4–4)
VIT B12 SERPL-MCNC: 331 PG/ML (ref 193–986)
WBC # BLD AUTO: 7.2 10E9/L (ref 4–11)

## 2020-08-18 PROCEDURE — 80053 COMPREHEN METABOLIC PANEL: CPT | Performed by: NURSE PRACTITIONER

## 2020-08-18 PROCEDURE — 83540 ASSAY OF IRON: CPT | Performed by: NURSE PRACTITIONER

## 2020-08-18 PROCEDURE — 99214 OFFICE O/P EST MOD 30 MIN: CPT | Performed by: NURSE PRACTITIONER

## 2020-08-18 PROCEDURE — 84443 ASSAY THYROID STIM HORMONE: CPT | Performed by: NURSE PRACTITIONER

## 2020-08-18 PROCEDURE — 84439 ASSAY OF FREE THYROXINE: CPT | Performed by: NURSE PRACTITIONER

## 2020-08-18 PROCEDURE — 82306 VITAMIN D 25 HYDROXY: CPT | Performed by: NURSE PRACTITIONER

## 2020-08-18 PROCEDURE — 82607 VITAMIN B-12: CPT | Performed by: NURSE PRACTITIONER

## 2020-08-18 PROCEDURE — 83550 IRON BINDING TEST: CPT | Performed by: NURSE PRACTITIONER

## 2020-08-18 PROCEDURE — 83036 HEMOGLOBIN GLYCOSYLATED A1C: CPT | Performed by: NURSE PRACTITIONER

## 2020-08-18 PROCEDURE — 36415 COLL VENOUS BLD VENIPUNCTURE: CPT | Performed by: NURSE PRACTITIONER

## 2020-08-18 PROCEDURE — 80050 GENERAL HEALTH PANEL: CPT | Performed by: NURSE PRACTITIONER

## 2020-08-18 ASSESSMENT — MIFFLIN-ST. JEOR: SCORE: 1754.26

## 2020-08-18 ASSESSMENT — PAIN SCALES - GENERAL: PAINLEVEL: NO PAIN (0)

## 2020-08-18 NOTE — PROGRESS NOTES
Subjective     Chani Martins is a 24 year old female who presents to clinic today for the following health issues:    HPI       FEET NUMBNESS       Duration: 5 months     Description (location/character/radiation):  bilateral feet     Intensity:  moderate    Accompanying signs and symptoms: constant     History (similar episodes/previous evaluation): None    Precipitating or alleviating factors: None    Therapies tried and outcome: None    NUMBNESS TOP OF FEET BILATERALLY BUT NOT BOTH AT THE SAME TIME.    LASTS FOR OVER AN HOUR.    NO PROBLEMS WITH DIABETES DURING PREGNANCY    LOW BACK PAIN WITH PREGNANCY.      B12 SUPPLEMENT ABOUT 3 WEEKS AGO.     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   Has had Xrays there and SI joint inflammation present.  Constant low back pain most days.        -------------------------------------    Patient Active Problem List   Diagnosis     Acne     Mood disorder (H)     Dysmenorrhea     Morbid obesity (H)     Past Surgical History:   Procedure Laterality Date     BUNIONECTOMY MACKENZIE  4/22/2011    Procedure:BUNIONECTOMY MACKENZIE; Subtalar Implant; Surgeon:HELGA URENA; Location:[place] OR     LENGTHEN TENDON ACHILLES  4/22/2011    Procedure:LENGTHEN TENDON ACHILLES; Surgeon:HELGA URENA; Location:[place] OR       Social History     Tobacco Use     Smoking status: Never Smoker     Smokeless tobacco: Never Used   Substance Use Topics     Alcohol use: Yes     Alcohol/week: 0.0 standard drinks     Comment: 1-2 drinks weekly-quit with pregnancy     Family History   Problem Relation Age of Onset     Other - See Comments Paternal Grandfather               Anemia Mother         with pregnancy     Unknown/Adopted Father         father adopted     Depression Father      Alcoholism Father      Breast Cancer Maternal Grandmother      Diabetes Maternal Grandmother      Depression Maternal  "Grandmother      Depression Maternal Grandfather         suicide           Reviewed and updated as needed this visit by Provider         Review of Systems   Constitutional, HEENT, cardiovascular, pulmonary, GI, , musculoskeletal, neuro, skin, endocrine and psych systems are negative, except as otherwise noted.      Objective    /82   Pulse 80   Temp 97.3  F (36.3  C) (Tympanic)   Resp 14   Ht 1.6 m (5' 3\")   Wt 102.5 kg (226 lb)   SpO2 99%   BMI 40.03 kg/m    Body mass index is 40.03 kg/m .  Physical Exam   GENERAL: healthy, alert and no distress  EYES: Eyes grossly normal to inspection, PERRL and conjunctivae and sclerae normal  HENT: ear canals and TM's normal, nose and mouth without ulcers or lesions  NECK: no adenopathy, no asymmetry, masses, or scars and thyroid normal to palpation  RESP: lungs clear to auscultation - no rales, rhonchi or wheezes  CV: regular rate and rhythm, normal S1 S2, no S3 or S4, no murmur, click or rub, no peripheral edema and peripheral pulses strong  ABDOMEN: soft, nontender, no hepatosplenomegaly, no masses and bowel sounds normal  MS: normal range of motion, peripheral pulses normal and tenderness to palpation Lumbar spine L5-S1 midline. SI joint bilaterally and Bilateral Trochanter Bursae  SKIN: no suspicious lesions or rashes  NEURO: sensory exam grossly normal, light touch and pinprick abnormal and mentation intact  PSYCH: mentation appears normal, affect normal/bright    Diagnostic Test Results:  Labs reviewed in Epic        Assessment & Plan     Chani was seen today for foot burn.    Diagnoses and all orders for this visit:    Numbness and tingling of both feet  -     Vitamin D Deficiency  -     Vitamin B12  -     Iron and iron binding capacity  -     CBC with platelets and differential  -     Hemoglobin A1c  -     Comprehensive metabolic panel (BMP + Alb, Alk Phos, ALT, AST, Total. Bili, TP)  -     TSH with free T4 reflex    Morbid obesity (H)    labs today to " look for cause of numbness and tingling  Suspect this is coming from her low back and SI joints  Symptomatic care strategies for low back discussed  Ok to use tylenol with breast feeding.  contine to work on better nutrition and daily physical activity for 30--60 minutes.        Call or return to the clinic with any worsening of symptoms or no resolution. Patient/Parent verbalized understanding and is in agreement. Medication side effects reviewed.   Current Outpatient Medications   Medication Sig Dispense Refill     Prenatal Vit-Fe Fumarate-FA (PRENATAL MULTIVITAMIN W/IRON) 27-0.8 MG tablet Take 1 tablet by mouth every evening        acyclovir (ZOVIRAX) 5 % external ointment Apply topically 6 times daily 30 g 2     Misc. Devices (BREAST PUMP) MISC 1 each daily 1 each 0       VERONICA Mac [facility]

## 2020-08-18 NOTE — PATIENT INSTRUCTIONS
Patient Education     Back Care Tips    Caring for your back  These are things you can do to prevent a recurrence of acute back pain and to reduce symptoms from chronic back pain:    Stay at a healthy weight. If you are overweight, losing weight will help most types of back pain.    Exercise is an important part of recovery from most types of back pain. The muscles behind and in front of the spine support the back. This means strengthening both the back muscles and the abdominal muscles will provide better support for your spine.     Swimming and brisk walking are good overall exercises to improve your fitness level.    Practice safe lifting methods (see below).    Practice good posture when sitting, standing, and walking. Don't sit for a long time. This puts more stress on the lower back than standing or walking.    Wear quality shoes with good arch support. Foot and ankle alignment can affect back symptoms. Don't wear high heels.    Therapeutic massage can help relax the back muscles without stretching them.    During the first 24 to 72 hours after an acute injury or flare-up of chronic back pain, put an ice pack on the painful area for 20 minutes and then remove it for 20 minutes. Do thisover a period of 60 to 90 minutes, or several times a day. As a safety precaution, don't use a heating pad at bedtime. Sleeping on a heating pad can lead to skin burns or tissue damage.    You can alternate using ice and heat.  Medicines  Talk with your healthcare provider before using medicines, especially if you have other health problems or are taking other medicines.    You may use over-the-counter medicines, such as acetaminophen, ibuprofen, or naprosyn to control pain, unless your healthcare provider prescribed other pain medicine. Talk with your healthcare provider before taking any medicines if you have a chronic condition such ass diabetes, liver or kidney disease, stomach ulcers, or digestive bleeding, or are taking  blood thinners.    Be careful if you are given prescription pain medicines, opioids, or medicine for muscle spasm. They can cause drowsiness, and affect your coordination, reflexes, and judgment. Don't drive or operate heavy machinery while taking these types of medicines. Take prescription pain medicine only as prescribed by your healthcare provider.  Lumbar stretch  This simple stretch will help relax muscle spasm and keep your back more limber. If exercise makes your back pain worse, don t do it.    Lie on your back with your knees bent and both feet on the ground.    Slowly raise your left knee to your chest as you flatten your lower back against the floor. Hold for 5 seconds.    Relax and repeat the exercise with your right knee.    Do 10 of these exercises for each leg.  Safe lifting method    Don t bend over at the waist to lift an object off the floor.  Instead, bend your knees and hips in a squat.     Keep your back and head upright    Hold the object close to your body, directly in front of you.    Straighten your legs to lift the object.     Lower the object to the floor in the reverse fashion.    If you must slide something across the floor, push it.    Posture tips  Sitting  Sit in chairs with straight backs or low-back support. Keep your knees lower than your hips, with your feet flat on the floor.  When driving, sit up straight. Adjust the seat forward so you are not leaning toward the steering wheel.  A small pillow or rolled towel behind your lower back may help if you are driving long distances.   Standing  When standing for long periods, shift most of your weight to one leg at a time. Switch legs every few minutes.   Sleeping  The best way to sleep is on your side with your knees bent. Put a low pillow under your head to support your neck in a neutral spine position. Don't use thick pillows that bend your neck to one side. Put a pillow between your legs to further relax your lower back. If you  sleep on your back, put pillows under your knees to support your legs in a slightly flexed position. Use a firm mattress. If your mattress sags, replace it, or use a 1/2-inch plywood board under the mattress to add support.  Follow-up care  Follow up with your healthcare provider, or as advised.  If X-rays, a CT scan or an MRI scan were taken, they may be reviewed by a radiologist. You will be told of any new findings that may affect your care.  Call 911  Call 911 if any of the following occur:    Trouble breathing    Confusion    Very drowsy    Fainting or loss of consciousness    Rapid or very slow heart rate    Loss of  bowel or bladder control  When to seek medical advice  Call your healthcare provider right away if any of the following occur:    Pain becomes worse or spreads to your arms or legs    Weakness or numbness in one or both arms or legs    Numbness in the groin area  Date Last Reviewed: 6/1/2016 2000-2019 [employer]. [address] [place] [address]. All rights reserved. This information is not intended as a substitute for professional medical care. Always follow your healthcare professional's instructions.           Patient Education     How Your Back Works  A healthy back lets you bend and stretch without pain. The spine has 3 natural curves. These keep your body balanced. Strong, flexible muscles support your spine. Soft, cushioning disks separate the hard bones of your spine. The disks let your spine bend and move.    The parts of the spine    The vertebrae are the 24 bones that make up the spine.    The spinous process is the part of each vertebra you can feel through your skin.    Each of these bones has a central hole that runs top to bottom. Together these holes form a tunnel called the spinal canal.    The lamina of each vertebra forms the back of the spinal canal.    Running through the canal are nerves. They are attached in a bundle called the spinal cord for most  of the canal.    A foramen is a small opening where a spinal nerve root leaves the spinal canal.    Disks serve as cushions between vertebrae. A disk s soft center absorbs shock during movement.     Two vertebrae and a disk     The supporting muscles  Strong, flexible muscles help maintain your 3 natural curves. They hold your spine in correct alignment. This helps support your upper body. Strong core muscles help take the strain off your back. These include the stomach, buttock, and thigh muscles.  Date Last Reviewed: 1/1/2018 2000-2019 [employer]. [address] [place] [address]. All rights reserved. This information is not intended as a substitute for professional medical care. Always follow your healthcare professional's instructions.           Patient Education     Back Exercises: Abdominal Lift Brace with Marching    The abdominal lift brace with march strengthens your lower abdominal muscles, helping you keep your pelvis and back stable:    Lie on the floor with both knees bent. Put your feet flat on the floor and your arms by your sides. Tighten your abdominal muscles. Be sure to continue to breathe.    Lift one bent knee about 2 inches then return it to the floor and lift the other about 2 inches. Keep your abdominal muscles tight and continue to breathe. These motions should be slow and controlled without your pelvis rocking side to side.    Repeat 10 times.  Date Last Reviewed: 3/1/2018    6687-7749 [employer]. [address] [place] [address]. All rights reserved. This information is not intended as a substitute for professional medical care. Always follow your healthcare professional's instructions.           Patient Education     Back Exercises: Arm Reach    Do this exercise on your hands and knees. Keep your knees under your hips and your hands under your shoulders. Keep your spine in a neutral position (not arched or sagging). Be sure to maintain  your neck s natural curve:    Stretch one arm straight out in front of you. Don t raise your head or let your supporting shoulder sag.    Hold for 5 seconds.    Return to starting position.    Repeat 5 times.    Switch arms.  Date Last Reviewed: 3/1/2018    0212-5471 [employer]. [address] [place] [address]. All rights reserved. This information is not intended as a substitute for professional medical care. Always follow your healthcare professional's instructions.           Patient Education     Back Exercises: Back Release  Do this exercise on your hands and knees. Keep your knees under your hips and your hands under your shoulders.        Relax your abdominal and buttocks muscles, lift your head, and let your back sag. Be sure to keep your weight evenly distributed. Don t sit back on your hips.     Hold for 5 seconds.    Return to starting position.    Tuck your head and lift (arch) your back.    Hold for 5 seconds    Return to starting position.    Repeat 5 times.  Date Last Reviewed: 3/1/2018    2930-9347 [employer]. [address] [place] [address]. All rights reserved. This information is not intended as a substitute for professional medical care. Always follow your healthcare professional's instructions.           Patient Education     Back Exercises: Lower Back Rotation    To start, lie on your back with your knees bent and feet flat on the floor. Don t press your neck or lower back to the floor. Breathe deeply. You should feel comfortable and relaxed in this position.    Drop both knees to one side. Turn your head to the other side. Keep your shoulders flat on the floor.    Do not push through pain.    Hold for 20 seconds.    Slowly switch sides.    Repeat 2 to 5 times.  Date Last Reviewed: 3/1/2018    9526-2711 [employer]. [address] [place] [address]. All rights reserved. This information is not intended as a substitute for  professional medical care. Always follow your healthcare professional's instructions.           Patient Education     Back Exercises: Lower Back Stretch    To start, sit in a chair with your feet flat on the floor. Shift your weight slightly forward. Relax, and keep your ears, shoulders, and hips aligned while you do the following:    Sit with your feet well apart.    Bend forward and touch the floor with the backs of your hands. Relax and let your body drop.    Hold for 20 seconds. Return to starting position.    Repeat 2 times.   Date Last Reviewed: 11/1/2017 2000-2019 [employer]. [address] [place], [place] [id number]. All rights reserved. This information is not intended as a substitute for professional medical care. Always follow your healthcare professional's instructions.           Patient Education     Understanding Lumbar Radiculopathy    Lumbar radiculopathy is irritation or inflammation of a nerve root in the low back. It causes symptoms that spread out from the back down one or both legs. To understand this condition, it helps to understand the parts of the spine:    Vertebrae. These are bones that stack to form the spine. The lumbar spine contains the 5 bottom vertebrae.    Disks. These are soft pads of tissue between the vertebrae. They act as shock absorbers for the spine.    Spinal canal. This is a tunnel formed within the stacked vertebrae. In the lumbar spine, nerves run through this canal.    Nerves. These branch off and leave the spinal canal, traveling out to parts of the body. As they leave the spinal canal, nerves pass through openings between the vertebrae. The nerve root is the part of the nerve that is closest to the spinal canal.    Sciatic nerve. This is a large nerve formed from several nerve roots in the low back. This nerve extends down the back of the leg to the foot.  With lumbar radiculopathy, nerve roots in the low back become irritated. This leads to pain and  symptoms. The sciatic nerve is commonly involved, so the condition is often called sciatica.  What causes lumbar radiculopathy?  Aging, injury, poor posture, extra body weight, and other issues can lead to problems in the low back. These problems may then irritate nerve roots. They include:    Damage to a disk in the lumbar spine. The damaged disk may then press on nearby nerve roots.    Degeneration from wear and tear, and aging. This can lead to narrowing (stenosis) of the openings between the vertebrae. The narrowed openings press on nerve roots as they leave the spinal canal.    Unstable spine. This is when a vertebra slips forward. It can then press on a nerve root.  Other, less common things can put pressure on nerves in the low back. These include diabetes, infection, or a tumor.  Symptoms of lumbar radiculopathy  These include:    Pain in the low back    Pain, numbness, tingling, or weakness that travels into the buttocks, hip, groin, or leg    Muscle spasms  Treatment for lumbar radiculopathy  In most cases, your healthcare provider will first try treatments that help relieve symptoms. These may include:    Prescription and over-the-counter pain medicines. These help relieve pain, swelling, and irritation.    Limits on positions and activities that increase pain. But lying in bed or avoiding all movement is only recommended for a short period of time.    Physical therapy, including exercises and stretches. This helps decrease pain and increase movement and function.    Steroid shots into the lower back. This may help relieve symptoms for a time.    Weight-loss program. If you are overweight, losing extra pounds may help relieve symptoms.  In some cases, you may need surgery to fix the underlying problem. This depends on the cause, the symptoms, and how long the pain has lasted.  Possible complications  Over time, an irritated and inflamed nerve may become damaged. This may lead to long-lasting (permanent)  numbness or weakness in your legs and feet. If symptoms change suddenly or get worse, be sure to let your healthcare provider know.  When to call your healthcare provider  Call your healthcare provider right away if you have any of these:    New pain or pain that gets worse    New or increasing weakness, tingling, or numbness in your leg or foot    Problems controlling your bladder or bowel   Date Last Reviewed: 3/10/2016    9050-8872 [employer]. [address] [place] [address], [place] [address]. All rights reserved. This information is not intended as a substitute for professional medical care. Always follow your healthcare professional's instructions.

## 2020-08-19 LAB — DEPRECATED CALCIDIOL+CALCIFEROL SERPL-MC: 24 UG/L (ref 20–75)

## 2020-08-24 ENCOUNTER — TRANSFERRED RECORDS (OUTPATIENT)
Dept: HEALTH INFORMATION MANAGEMENT | Facility: CLINIC | Age: 25
End: 2020-08-24

## 2020-08-24 ENCOUNTER — TELEPHONE (OUTPATIENT)
Dept: FAMILY MEDICINE | Facility: CLINIC | Age: 25
End: 2020-08-24

## 2020-08-24 NOTE — TELEPHONE ENCOUNTER
She may or may not need an MRI.  We don't order an MRI based of the chiropractor recommendation.  I would contact the chiropractor, or schedule a visit to have it evalulated to see if we agree she needs an MRI.     Most knee injuries we wait a few weeks to see how things settle down before deciding on MRI.

## 2020-08-24 NOTE — TELEPHONE ENCOUNTER
Reason for Call: Request for an order or referral:    Order or referral being requested: Pt injured Rt  knee over the weekend. Pt went to Chiropractor on Sunday. [facility]. Pt was to go to [facility] in [place] for  MRI. Pt said they are not in her Net work. Wondering if she can get a order for the MRI at [facility].     Phone number Patient can be reached at:  Home number on file [phone] (home)    Best Time:  Any Time      Can we leave a detailed message on this number?  YES    Call taken on 8/24/2020 at 11:09 AM by Hafsa Haddad

## 2020-08-24 NOTE — TELEPHONE ENCOUNTER
"Message below from Dr. Ley relayed to pt in its entirety: \"Okay well thank you.\"     Lety PAYNE RN, BSN      "

## 2020-08-25 ENCOUNTER — MYC MEDICAL ADVICE (OUTPATIENT)
Dept: FAMILY MEDICINE | Facility: CLINIC | Age: 25
End: 2020-08-25

## 2020-08-27 ENCOUNTER — TRANSFERRED RECORDS (OUTPATIENT)
Dept: HEALTH INFORMATION MANAGEMENT | Facility: CLINIC | Age: 25
End: 2020-08-27

## 2020-08-28 NOTE — TELEPHONE ENCOUNTER
MRI right knee received from [facility]  Right knee pain attributed to a twisting injury.  No history of previous surgery  Findings  Complete tear of the anterior cruciate ligament  Complex tear of the posterior horn of the medial meniscus  Tear of the anterior inferior and posterior inferior popliteal meniscal fascicles no tear of the lateral meniscus.  Sprain of the arcuate ligament complex  Mild sprain of the popliteus muscle  Large right knee effusion    We will have the report scanned to her chart under media for further review  Thanks Kellie Echeverria [facility]

## 2020-09-14 ENCOUNTER — OFFICE VISIT (OUTPATIENT)
Dept: FAMILY MEDICINE | Facility: CLINIC | Age: 25
End: 2020-09-14
Payer: COMMERCIAL

## 2020-09-14 VITALS
HEIGHT: 63 IN | BODY MASS INDEX: 40.22 KG/M2 | SYSTOLIC BLOOD PRESSURE: 122 MMHG | RESPIRATION RATE: 18 BRPM | HEART RATE: 60 BPM | TEMPERATURE: 97.7 F | WEIGHT: 227 LBS | DIASTOLIC BLOOD PRESSURE: 70 MMHG

## 2020-09-14 DIAGNOSIS — S83.511D RUPTURE OF ANTERIOR CRUCIATE LIGAMENT OF RIGHT KNEE, SUBSEQUENT ENCOUNTER: ICD-10-CM

## 2020-09-14 DIAGNOSIS — Z01.818 PREOP GENERAL PHYSICAL EXAM: Primary | ICD-10-CM

## 2020-09-14 LAB
ANION GAP SERPL CALCULATED.3IONS-SCNC: 2 MMOL/L (ref 3–14)
BUN SERPL-MCNC: 13 MG/DL (ref 7–30)
CALCIUM SERPL-MCNC: 9.1 MG/DL (ref 8.5–10.1)
CHLORIDE SERPL-SCNC: 109 MMOL/L (ref 94–109)
CO2 SERPL-SCNC: 27 MMOL/L (ref 20–32)
CREAT SERPL-MCNC: 0.82 MG/DL (ref 0.52–1.04)
ERYTHROCYTE [DISTWIDTH] IN BLOOD BY AUTOMATED COUNT: 11.8 % (ref 10–15)
GFR SERPL CREATININE-BSD FRML MDRD: >90 ML/MIN/{1.73_M2}
GLUCOSE SERPL-MCNC: 85 MG/DL (ref 70–99)
HCG SERPL QL: NEGATIVE
HCT VFR BLD AUTO: 42 % (ref 35–47)
HGB BLD-MCNC: 14.1 G/DL (ref 11.7–15.7)
MCH RBC QN AUTO: 29.9 PG (ref 26.5–33)
MCHC RBC AUTO-ENTMCNC: 33.6 G/DL (ref 31.5–36.5)
MCV RBC AUTO: 89 FL (ref 78–100)
PLATELET # BLD AUTO: 302 10E9/L (ref 150–450)
POTASSIUM SERPL-SCNC: 4 MMOL/L (ref 3.4–5.3)
RBC # BLD AUTO: 4.72 10E12/L (ref 3.8–5.2)
SODIUM SERPL-SCNC: 138 MMOL/L (ref 133–144)
WBC # BLD AUTO: 6.2 10E9/L (ref 4–11)

## 2020-09-14 PROCEDURE — 99214 OFFICE O/P EST MOD 30 MIN: CPT | Performed by: NURSE PRACTITIONER

## 2020-09-14 PROCEDURE — 80048 BASIC METABOLIC PNL TOTAL CA: CPT | Performed by: NURSE PRACTITIONER

## 2020-09-14 PROCEDURE — 85027 COMPLETE CBC AUTOMATED: CPT | Performed by: NURSE PRACTITIONER

## 2020-09-14 PROCEDURE — 36415 COLL VENOUS BLD VENIPUNCTURE: CPT | Performed by: NURSE PRACTITIONER

## 2020-09-14 PROCEDURE — 84703 CHORIONIC GONADOTROPIN ASSAY: CPT | Performed by: NURSE PRACTITIONER

## 2020-09-14 ASSESSMENT — MIFFLIN-ST. JEOR: SCORE: 1758.8

## 2020-09-14 NOTE — PROGRESS NOTES
[facility]  [address] [place] [id number]  Phone: [phone]  Fax: [phone]  Primary Provider: Kellie Echeverria  Pre-op Performing Provider: TOOTIE HOFFMANN    PREOPERATIVE EVALUATION:  Today's date: 9/14/2020    Chani Martins is a 24 year old female who presents for a preoperative evaluation.    Surgical Information:  Surgery Details 9/14/2020   Surgery/Procedure: ACL reconstruction and meniscus repair   Surgery Location: [facility]   Surgeon: annette dwyer   Surgery Date: 09/16/2020   Time of Surgery: unknown   Where patient plans to recover: At home with family   Additional recovery plan details: N/A     Fax number for surgical facility: [phone]  Type of Anesthesia Anticipated: to be determined    Subjective     HPI related to upcoming procedure: ACL reconstruction and meniscus repair  Preop Questions 9/14/2020   1. Have you ever had a heart attack or stroke? No   2. Have you ever had surgery on your heart or blood vessels, such as a stent placement, a coronary artery bypass, or surgery on an artery in your head, neck, heart, or legs? No   3. Do you have chest pain with activity? No   4. Do you have a history of  heart failure? No   5. Do you currently have a cold, bronchitis or symptoms of other infection? No   6. Do you have a cough, shortness of breath, or wheezing? No   7. Do you or anyone in your family have previous history of blood clots? No   8. Do you or does anyone in your family have a serious bleeding problem such as prolonged bleeding following surgeries or cuts? No   9. Have you ever had problems with anemia or been told to take iron pills? YES    10. Have you had any abnormal blood loss such as black, tarry or bloody stools, or abnormal vaginal bleeding? No   11. Have you ever had a blood transfusion? No   Are you willing to have a blood transfusion if it is medically needed before, during, or after your surgery? Yes   13.  Have you or any of your relatives ever had problems with anesthesia? No   14. Do you have sleep apnea, excessive snoring or daytime drowsiness? No   15. Do you have any artifical heart valves or other implanted medical devices like a pacemaker, defibrillator, or continuous glucose monitor? No   16. Do you have artificial joints? No   17. Are you allergic to latex? No   18. Is there any chance that you may be pregnant? No     Patient does not have a Health Care Directive or Living Will: Discussed advance care planning with patient; however, patient declined at this time.    [place] ([place] reviewed:  reviewed- no concerns    See problem list for active medical problems.  Problems all longstanding and stable, except as noted/documented.  See ROS for pertinent symptoms related to these conditions.      Review of Systems  CONSTITUTIONAL: NEGATIVE for fever, chills, change in weight  INTEGUMENTARY/SKIN: NEGATIVE for worrisome rashes, moles or lesions  EYES: NEGATIVE for vision changes or irritation  ENT/MOUTH: NEGATIVE for ear, mouth and throat problems  RESP: NEGATIVE for significant cough or SOB  BREAST: NEGATIVE for masses, tenderness or discharge  CV: NEGATIVE for chest pain, palpitations or peripheral edema  GI: NEGATIVE for nausea, abdominal pain, heartburn, or change in bowel habits  : NEGATIVE for frequency, dysuria, or hematuria  MUSCULOSKELETAL: NEGATIVE for significant arthralgias or myalgia  NEURO: NEGATIVE for weakness, dizziness or paresthesias  ENDOCRINE: NEGATIVE for temperature intolerance, skin/hair changes  HEME: NEGATIVE for bleeding problems  PSYCHIATRIC: NEGATIVE for changes in mood or affect    Patient Active Problem List    Diagnosis Date Noted     Morbid obesity (H) 08/18/2020     Priority: Medium     Mood disorder (H) 04/25/2012     Priority: Medium     Dysmenorrhea 04/25/2012     Priority: Medium     Acne 08/16/2011     Priority: Medium      Past Medical History:   Diagnosis  "Date     Anemia      Anxiety      Depression      Past Surgical History:   Procedure Laterality Date     BUNIONECTOMY MACKENZIE  4/22/2011    Procedure:BUNIONECTOMY MACKENZIE; Subtalar Implant; Surgeon:HELGA URENA; Location:[place] OR     LENGTHEN TENDON ACHILLES  4/22/2011    Procedure:LENGTHEN TENDON ACHILLES; Surgeon:HELGA URENA; Location:[place] OR     Current Outpatient Medications   Medication Sig Dispense Refill     acyclovir (ZOVIRAX) 5 % external ointment Apply topically 6 times daily 30 g 2     Misc. Devices (BREAST PUMP) MISC 1 each daily 1 each 0     Prenatal Vit-Fe Fumarate-FA (PRENATAL MULTIVITAMIN W/IRON) 27-0.8 MG tablet Take 1 tablet by mouth every evening          No Known Allergies     Social History     Tobacco Use     Smoking status: Never Smoker     Smokeless tobacco: Never Used   Substance Use Topics     Alcohol use: Yes     Alcohol/week: 0.0 standard drinks     Comment: 1-2 drinks weekly-quit with pregnancy     Family History   Problem Relation Age of Onset     Other - See Comments Paternal Grandfather               Anemia Mother         with pregnancy     Unknown/Adopted Father         father adopted     Depression Father      Alcoholism Father      Breast Cancer Maternal Grandmother      Diabetes Maternal Grandmother      Depression Maternal Grandmother      Depression Maternal Grandfather         suicide     History   Drug Use No            Objective   /70 (BP Location: Right arm, Cuff Size: Adult Large)   Pulse 60   Temp 97.7  F (36.5  C) (Tympanic)   Resp 18   Ht 1.6 m (5' 3\")   Wt 103 kg (227 lb)   BMI 40.21 kg/m    Physical Exam    GENERAL APPEARANCE: healthy, alert and no distress     EYES: EOMI, PERRL     HENT: ear canals and TM's normal and nose and mouth without ulcers or lesions     NECK: no adenopathy, no asymmetry, masses, or scars and thyroid normal to palpation     RESP: lungs clear to auscultation - no rales, rhonchi or wheezes     CV: " regular rates and rhythm, normal S1 S2, no S3 or S4 and no murmur, click or rub     ABDOMEN:  soft, nontender, no HSM or masses and bowel sounds normal     MS: extremities normal- no gross deformities noted, no evidence of inflammation in joints, FROM in all extremities.     SKIN: no suspicious lesions or rashes     NEURO: Normal strength and tone, sensory exam grossly normal, mentation intact and speech normal     PSYCH: mentation appears normal. and affect normal/bright     LYMPHATICS: No cervical adenopathy    Recent Labs   Lab Test 08/18/20  1209 01/05/20  0554 01/02/20  1846  07/23/19  1429   HGB 13.6 7.8* 10.6*   < > 12.5     --  277   < > 256     --   --   --  140   POTASSIUM 3.8  --   --   --  3.6   CR 0.67  --   --   --  0.54   A1C 5.1  --   --   --   --     < > = values in this interval not displayed.        PRE-OP Diagnostics:  Recent Results (from the past 24 hour(s))   HCG qualitative, Blood ([id number])    Collection Time: 09/14/20  9:42 AM   Result Value Ref Range    HCG Qualitative Serum Negative NEG^Negative   CBC with platelets    Collection Time: 09/14/20  9:42 AM   Result Value Ref Range    WBC 6.2 4.0 - 11.0 10e9/L    RBC Count 4.72 3.8 - 5.2 10e12/L    Hemoglobin 14.1 11.7 - 15.7 g/dL    Hematocrit 42.0 35.0 - 47.0 %    MCV 89 78 - 100 fl    MCH 29.9 26.5 - 33.0 pg    MCHC 33.6 31.5 - 36.5 g/dL    RDW 11.8 10.0 - 15.0 %    Platelet Count 302 150 - 450 10e9/L     No EKG required for low risk surgery (cataract, skin procedure, breast biopsy, etc).         Assessment & Plan   The proposed surgical procedure is considered LOW risk.    REVISED CARDIAC RISK INDEX  The patient has the following serious cardiovascular risks for perioperative complications:  No serious cardiac risks = 0 points    INTERPRETATION: 0 points: Class I (very low risk - 0.4% complication rate)       Chani was seen today for pre-op exam.    Diagnoses and all orders for this visit:    Preop general physical exam  -      HCG qualitative, Blood ([id number])  -     Basic metabolic panel  -     CBC with platelets    Rupture of anterior cruciate ligament of right knee, subsequent encounter        The patient has the following additional risks and recommendations for perioperative complications:     - No identified additional risk factors other than previously addressed     MEDICATION INSTRUCTIONS:  Patient is to take all scheduled medications on the day of surgery    RECOMMENDATION:  APPROVAL GIVEN to proceed with proposed procedure, without further diagnostic evaluation.    No follow-ups on file.    Signed Electronically by: VERONICA Purdy CNP    Copy of this evaluation report is provided to requesting physician.    Preop [facility] Preop Guidelines    Revised Cardiac Risk Index

## 2020-09-14 NOTE — PATIENT INSTRUCTIONS

## 2020-09-17 ENCOUNTER — NURSE TRIAGE (OUTPATIENT)
Dept: NURSING | Facility: CLINIC | Age: 25
End: 2020-09-17

## 2020-09-18 NOTE — TELEPHONE ENCOUNTER
Patient calling asking if taking the following medication will have effect on her baby while breastfeeding. Ketorolac 10 mg and Oxycodone-acetaminophen 5-325mg.    Per [employer] resource, informed patient infant risk cannot be ruled out taking the Oxycodone 5 mg medication and that with Ketorolac infant risk has been demonstrated.     Advised patient to call clinic in the morning to consult with her provider as well. Advised to call back if having severe pain.     Jasen Collins RN  [facility]

## 2020-10-01 ENCOUNTER — TRANSFERRED RECORDS (OUTPATIENT)
Dept: HEALTH INFORMATION MANAGEMENT | Facility: CLINIC | Age: 25
End: 2020-10-01

## 2020-12-13 ENCOUNTER — HEALTH MAINTENANCE LETTER (OUTPATIENT)
Age: 25
End: 2020-12-13

## 2021-04-17 ENCOUNTER — HEALTH MAINTENANCE LETTER (OUTPATIENT)
Age: 26
End: 2021-04-17

## 2021-09-26 ENCOUNTER — HEALTH MAINTENANCE LETTER (OUTPATIENT)
Age: 26
End: 2021-09-26

## 2022-05-08 ENCOUNTER — HEALTH MAINTENANCE LETTER (OUTPATIENT)
Age: 27
End: 2022-05-08

## 2023-01-14 ENCOUNTER — HEALTH MAINTENANCE LETTER (OUTPATIENT)
Age: 28
End: 2023-01-14

## 2023-06-02 ENCOUNTER — HEALTH MAINTENANCE LETTER (OUTPATIENT)
Age: 28
End: 2023-06-02

## 2024-08-29 ENCOUNTER — TELEPHONE (OUTPATIENT)
Dept: FAMILY MEDICINE | Facility: CLINIC | Age: 29
End: 2024-08-29

## 2024-08-29 NOTE — TELEPHONE ENCOUNTER
Reason for Call:  Appointment Request    Patient requesting this type of appt:  Rash - started 8/17    Requested provider: Kellie Echeverria    Reason patient unable to be scheduled: Not within requested timeframe    When does patient want to be seen/preferred time: Same day    Comments: Looking for a same day in person or virtual if possible    Could we send this information to you in [facility] or would you prefer to receive a phone call?:   Patient would prefer a phone call   Okay to leave a detailed message?: Yes at Cell number on file:    Telephone Information:   Mobile [phone]       Call taken on 8/29/2024 at 9:11 AM by MUKUL Blackburn

## 2024-08-29 NOTE — TELEPHONE ENCOUNTER
Patient returned call. Only wanting to see Kellie as patient live several hours away. Patient will go to urgent care close to her

## (undated) RX ORDER — LIDOCAINE HCL/EPINEPHRINE/PF 2%-1:200K
VIAL (ML) INJECTION
Status: DISPENSED
Start: 2020-01-04